# Patient Record
Sex: FEMALE | Race: WHITE | NOT HISPANIC OR LATINO | Employment: FULL TIME | URBAN - METROPOLITAN AREA
[De-identification: names, ages, dates, MRNs, and addresses within clinical notes are randomized per-mention and may not be internally consistent; named-entity substitution may affect disease eponyms.]

---

## 2017-03-21 ENCOUNTER — HOSPITAL ENCOUNTER (EMERGENCY)
Facility: HOSPITAL | Age: 42
Discharge: HOME | End: 2017-03-21
Attending: SURGERY
Payer: MEDICAID

## 2017-03-21 VITALS
HEART RATE: 71 BPM | DIASTOLIC BLOOD PRESSURE: 64 MMHG | RESPIRATION RATE: 23 BRPM | SYSTOLIC BLOOD PRESSURE: 123 MMHG | TEMPERATURE: 98 F | OXYGEN SATURATION: 100 %

## 2017-03-21 DIAGNOSIS — R42 DIZZINESS: ICD-10-CM

## 2017-03-21 DIAGNOSIS — G43.909 MIGRAINE WITHOUT STATUS MIGRAINOSUS, NOT INTRACTABLE, UNSPECIFIED MIGRAINE TYPE: Primary | ICD-10-CM

## 2017-03-21 DIAGNOSIS — G43.001 MIGRAINE WITHOUT AURA AND WITH STATUS MIGRAINOSUS, NOT INTRACTABLE: ICD-10-CM

## 2017-03-21 LAB
ALBUMIN SERPL BCP-MCNC: 4.1 G/DL
ALP SERPL-CCNC: 74 U/L
ALT SERPL W/O P-5'-P-CCNC: 18 U/L
ANION GAP SERPL CALC-SCNC: 9 MMOL/L
APTT BLDCRRT: 25.6 SEC
AST SERPL-CCNC: 18 U/L
BASOPHILS # BLD AUTO: 0.05 K/UL
BASOPHILS NFR BLD: 0.6 %
BILIRUB SERPL-MCNC: 0.5 MG/DL
BNP SERPL-MCNC: 22 PG/ML
BUN SERPL-MCNC: 15 MG/DL
CALCIUM SERPL-MCNC: 9 MG/DL
CHLORIDE SERPL-SCNC: 106 MMOL/L
CK MB SERPL-MCNC: 1.7 NG/ML
CK MB SERPL-RTO: 1.1 %
CK SERPL-CCNC: 157 U/L
CK SERPL-CCNC: 157 U/L
CO2 SERPL-SCNC: 24 MMOL/L
CREAT SERPL-MCNC: 1.2 MG/DL
D DIMER PPP IA.FEU-MCNC: 0.27 MG/L FEU
DIFFERENTIAL METHOD: ABNORMAL
EOSINOPHIL # BLD AUTO: 0.1 K/UL
EOSINOPHIL NFR BLD: 0.7 %
ERYTHROCYTE [DISTWIDTH] IN BLOOD BY AUTOMATED COUNT: 13.4 %
EST. GFR  (AFRICAN AMERICAN): >60 ML/MIN/1.73 M^2
EST. GFR  (NON AFRICAN AMERICAN): 57 ML/MIN/1.73 M^2
GLUCOSE SERPL-MCNC: 91 MG/DL
HCT VFR BLD AUTO: 35.2 %
HGB BLD-MCNC: 11.9 G/DL
INR PPP: 1
LYMPHOCYTES # BLD AUTO: 2 K/UL
LYMPHOCYTES NFR BLD: 23.1 %
MCH RBC QN AUTO: 28.1 PG
MCHC RBC AUTO-ENTMCNC: 33.8 %
MCV RBC AUTO: 83 FL
MONOCYTES # BLD AUTO: 0.5 K/UL
MONOCYTES NFR BLD: 6.4 %
NEUTROPHILS # BLD AUTO: 5.8 K/UL
NEUTROPHILS NFR BLD: 69.2 %
PLATELET # BLD AUTO: 409 K/UL
PMV BLD AUTO: 8.3 FL
POTASSIUM SERPL-SCNC: 3.8 MMOL/L
PROT SERPL-MCNC: 8.2 G/DL
PROTHROMBIN TIME: 10.5 SEC
RBC # BLD AUTO: 4.24 M/UL
SODIUM SERPL-SCNC: 139 MMOL/L
TROPONIN I SERPL DL<=0.01 NG/ML-MCNC: <0.006 NG/ML
TROPONIN I SERPL DL<=0.01 NG/ML-MCNC: <0.006 NG/ML
WBC # BLD AUTO: 8.44 K/UL

## 2017-03-21 PROCEDURE — 83880 ASSAY OF NATRIURETIC PEPTIDE: CPT

## 2017-03-21 PROCEDURE — 25000003 PHARM REV CODE 250: Performed by: SURGERY

## 2017-03-21 PROCEDURE — 85379 FIBRIN DEGRADATION QUANT: CPT

## 2017-03-21 PROCEDURE — 99285 EMERGENCY DEPT VISIT HI MDM: CPT

## 2017-03-21 PROCEDURE — 27000494 *HC OXYGEN SET UP (STAH ONLY)

## 2017-03-21 PROCEDURE — 85025 COMPLETE CBC W/AUTO DIFF WBC: CPT

## 2017-03-21 PROCEDURE — 36415 COLL VENOUS BLD VENIPUNCTURE: CPT

## 2017-03-21 PROCEDURE — 84484 ASSAY OF TROPONIN QUANT: CPT | Mod: 91

## 2017-03-21 PROCEDURE — 93010 ELECTROCARDIOGRAM REPORT: CPT | Mod: ,,, | Performed by: INTERNAL MEDICINE

## 2017-03-21 PROCEDURE — 85610 PROTHROMBIN TIME: CPT

## 2017-03-21 PROCEDURE — 80053 COMPREHEN METABOLIC PANEL: CPT

## 2017-03-21 PROCEDURE — 82553 CREATINE MB FRACTION: CPT

## 2017-03-21 PROCEDURE — 85730 THROMBOPLASTIN TIME PARTIAL: CPT

## 2017-03-21 PROCEDURE — 93005 ELECTROCARDIOGRAM TRACING: CPT

## 2017-03-21 RX ORDER — NAPROXEN SODIUM 220 MG/1
81 TABLET, FILM COATED ORAL
Status: COMPLETED | OUTPATIENT
Start: 2017-03-21 | End: 2017-03-21

## 2017-03-21 RX ORDER — MECLIZINE HYDROCHLORIDE 25 MG/1
25 TABLET ORAL 3 TIMES DAILY PRN
Qty: 20 TABLET | Refills: 0 | Status: SHIPPED | OUTPATIENT
Start: 2017-03-21 | End: 2017-04-03

## 2017-03-21 RX ORDER — ONDANSETRON 4 MG/1
4 TABLET, ORALLY DISINTEGRATING ORAL EVERY 8 HOURS PRN
Qty: 20 TABLET | Refills: 0 | Status: SHIPPED | OUTPATIENT
Start: 2017-03-21 | End: 2017-04-03

## 2017-03-21 RX ORDER — KETOROLAC TROMETHAMINE 10 MG/1
10 TABLET, FILM COATED ORAL EVERY 6 HOURS PRN
Qty: 15 TABLET | Refills: 0 | Status: SHIPPED | OUTPATIENT
Start: 2017-03-21 | End: 2017-04-03

## 2017-03-21 RX ADMIN — ASPIRIN 81 MG: 81 TABLET, CHEWABLE ORAL at 12:03
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
## 2017-03-21 NOTE — ED NOTES
Pt resting in bed, denies any concerns at this time. Pt reports a decrease in chest pressure. VSS.

## 2017-03-21 NOTE — ED PROVIDER NOTES
[facility]                                        March 21, 2017                   Chief Complaint  41 y.o. female with Chest Pain    History of Present Illness  Nolvia Garcia presents to the emergency room with hand tingling headache and chest pain  Patient presents to ER with a headache and hand tingling since this morning, chest tightness  Patient is normal sinus rhythm on EKG without ST changes or concerning findings in the ER  Patient states her hands been tingling bilaterally, she has a normal motor neuro exam today  Patient has a completely normal cardiac and pulmonary exam on evaluation the ER today  Patient had a completely negative workup including 2 sets of negative troponins/head CT    The history is provided by the patient  Medical history: Anxiety and GERD  Surgical history: Appendectomy and tubal ligation  No Known Allergies     Review of Systems and Physical Exam     Review of Systems  -- Constitution - no fever, denies fatigue, no weakness, no chills  -- Eyes - no tearing or redness, no visual disturbance  -- Ear, Nose - no tinnitus or earache, no nasal congestion or discharge  -- Mouth,Throat - no sore throat, no toothache, normal voice, normal swallowing  -- Respiratory - denies cough and congestion, no shortness of breath, no SALAS  -- Cardiovascular - chest pain, no palpitations, denies claudication  -- Gastrointestinal - denies abdominal pain, nausea, vomiting, or diarrhea  -- Genitourinary - no dysuria, no denies flank pain, no hematuria or frequency   -- Musculoskeletal - denies back pain, negative for myalgias and arthralgias   -- Neurological - dizziness, no headache, denies weakness or seizure; no LOC  -- Skin - denies pallor, rash, or changes in skin. no hives or welts noted    Vital Signs  -- Her blood pressure is 123/64 and her pulse is 71.   -- Her respiration is 23 (abnormal) and oxygen saturation is 100%.      Physical Exam  -- Nursing note and vitals reviewed  --  Constitutional: Appears well-developed and well-nourished  -- Head: Atraumatic. Normocephalic. No obvious abnormality  -- Eyes: Pupils are equal and reactive to light. Normal conjunctiva and lids  -- Cardiac: Normal rate, regular rhythm and normal heart sounds  -- Pulmonary: Normal respiratory effort, breath sounds clear to auscultation  -- Abdominal: Soft, no tenderness. Normal bowel sounds. Normal liver edge  -- Musculoskeletal: Normal range of motion, no effusions. Joints stable   -- Neurological: No focal deficits. Showed good interaction with staff  -- Vascular: Posterior tibial, dorsalis pedis and radial pulses 2+ bilaterally      Emergency Room Course     Treatment and Evaluation  -- The CT of the head performed in the ER today was negative for acute pathology   -- The EKG findings today were without concerning findings from baseline   -- The electrolytes drawn in the ER today were within normal limits   -- The CBC drawn in the ER today was within normal limits   -- The BNP drawn in the ER today were within normal limits   -- The d-dimer drawn in the ER today were within normal limits.   -- The PT, PTT, and INR were within normal limits.    -- PO ASA given in today in the ER    Diagnosis  -- Migraine without status migrainosus  -- Dizziness     Disposition and Plan  -- Disposition: home  -- Condition: stable  -- Follow-up: Patient to follow up with Maia Alford NP in 1-2 days.  -- I advised the patient that we have found no life threatening condition today  -- At this time, I believe the patient is clinically stable for discharge.   -- The patient acknowledges that close follow up with a MD is required   -- Patient agrees to comply with all instruction and direction given in the ER    This note is dictated on Dragon Natural Speaking word recognition program.  There are word recognition mistakes that are occasionally missed on review.           Peyman Cortez MD  03/21/17 8125
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
## 2017-04-03 ENCOUNTER — OFFICE VISIT (OUTPATIENT)
Dept: INTERNAL MEDICINE | Facility: CLINIC | Age: 42
End: 2017-04-03
Payer: MEDICAID

## 2017-04-03 VITALS
HEART RATE: 82 BPM | SYSTOLIC BLOOD PRESSURE: 102 MMHG | DIASTOLIC BLOOD PRESSURE: 64 MMHG | BODY MASS INDEX: 36.1 KG/M2 | OXYGEN SATURATION: 98 % | WEIGHT: 183.88 LBS | RESPIRATION RATE: 14 BRPM | HEIGHT: 60 IN

## 2017-04-03 DIAGNOSIS — D64.9 ANEMIA, UNSPECIFIED TYPE: ICD-10-CM

## 2017-04-03 DIAGNOSIS — Z12.31 ENCOUNTER FOR SCREENING MAMMOGRAM FOR MALIGNANT NEOPLASM OF BREAST: ICD-10-CM

## 2017-04-03 DIAGNOSIS — K21.9 GASTROESOPHAGEAL REFLUX DISEASE, ESOPHAGITIS PRESENCE NOT SPECIFIED: ICD-10-CM

## 2017-04-03 DIAGNOSIS — G43.009 MIGRAINE WITHOUT AURA AND WITHOUT STATUS MIGRAINOSUS, NOT INTRACTABLE: Primary | ICD-10-CM

## 2017-04-03 PROCEDURE — 99213 OFFICE O/P EST LOW 20 MIN: CPT | Mod: PBBFAC | Performed by: INTERNAL MEDICINE

## 2017-04-03 PROCEDURE — 99204 OFFICE O/P NEW MOD 45 MIN: CPT | Mod: S$PBB,,, | Performed by: INTERNAL MEDICINE

## 2017-04-03 PROCEDURE — 99999 PR PBB SHADOW E&M-EST. PATIENT-LVL III: CPT | Mod: PBBFAC,,, | Performed by: INTERNAL MEDICINE
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
## 2017-04-03 NOTE — MR AVS SNAPSHOT
[facility]  [address] [place] [address]  Phone: [phone]  Fax: [phone]                  Nolvia Garcia   4/3/2017 2:30 PM   Office Visit    Description:  Female : 1975   Provider:  Ten Hunt MD   Department:  [facility]           Reason for Visit     Migraine           Diagnoses this Visit        Comments    Migraine without aura and without status migrainosus, not intractable    -  Primary     Gastroesophageal reflux disease, esophagitis presence not specified         Anemia, unspecified type         Encounter for screening mammogram for malignant neoplasm of breast                To Do List           Goals (5 Years of Data)     None      Follow-Up and Disposition     Return in about 3 months (around 7/3/2017).      [facility]     [facility] -  Assistance  Unless otherwise directed by your provider, please contact [facility], our nurse care line that is available for  assistance.     Registered nurses in the [facility] provide: appointment scheduling, clinical advisement, health education, and other advisory services.  Call: [phone] (toll free)               Medications           Message regarding Medications     Verify the changes and/or additions to your medication regime listed below are the same as discussed with your clinician today.  If any of these changes or additions are incorrect, please notify your healthcare provider.        STOP taking these medications     hydrOXYzine pamoate (VISTARIL) 25 MG Cap Take 25 mg by mouth once daily.    ketorolac (TORADOL) 10 mg tablet Take 1 tablet (10 mg total) by mouth every 6 (six) hours as needed for Pain.    meclizine (ANTIVERT) 25 mg tablet Take 1 tablet (25 mg total) by mouth 3 (three) times daily as needed.    medroxyPROGESTERone (PROVERA) 10 MG tablet 1 po day on 16- of cycle    metoclopramide HCl (REGLAN) 5 MG tablet Take at onset  of headache with Imitrex. May repeat dose in 4 hours if necessary. Limit use to 3 days per week.    ondansetron (ZOFRAN-ODT) 4 MG TbDL Take 1 tablet (4 mg total) by mouth every 8 (eight) hours as needed (nausea).    rabeprazole (ACIPHEX) 20 mg tablet Take 20 mg by mouth once daily.    citalopram (CELEXA) 20 MG tablet Take 1 tablet (20 mg total) by mouth once daily.           Verify that the below list of medications is an accurate representation of the medications you are currently taking.  If none reported, the list may be blank. If incorrect, please contact your healthcare provider. Carry this list with you in case of emergency.           Current Medications     sumatriptan (IMITREX) 100 MG tablet Take one tablet at onset of headache. May repeat in 2 hours if needed. Max of 2 tabs in 24 hours    topiramate (TOPAMAX) 50 MG tablet One daily for headache prevention           Clinical Reference Information           Your Vitals Were     BP Pulse Resp Height Weight Last Period    102/64 82 14 5' (1.524 m) 83.4 kg (183 lb 13.8 oz) 02/02/2017    SpO2 BMI             98% 35.91 kg/m2         Blood Pressure          Most Recent Value    BP  102/64      Allergies as of 4/3/2017     No Known Allergies      Immunizations Administered on Date of Encounter - 4/3/2017     None      Orders Placed During Today's Visit     Future Labs/Procedures Expected by Expires    Mammo Digital Screening Bilat with CAD  4/3/2017 (Approximate) 9/30/2017    CBC auto differential  5/2/2017 4/3/2018      Language Assistance Services     ATTENTION: Language assistance services are available, free of charge. Please call [phone].      ATENCIÓN: Si habla español, tiene a rios disposición servicios gratuitos de asistencia lingüística. Llame al [phone].     CHÚ Ý: N?u b?n nói Ti?ng Vi?t, có các d?ch v? h? tr? ngôn ng? mi?n phí dành cho b?n. G?i s? [phone].         [facility] complies with applicable Federal civil  rights laws and does not discriminate on the basis of race, color, national origin, age, disability, or sex.

## 2017-04-17 ENCOUNTER — HOSPITAL ENCOUNTER (OUTPATIENT)
Dept: RADIOLOGY | Facility: HOSPITAL | Age: 42
Discharge: HOME | End: 2017-04-17
Attending: INTERNAL MEDICINE
Payer: MEDICAID

## 2017-04-17 DIAGNOSIS — Z12.31 ENCOUNTER FOR SCREENING MAMMOGRAM FOR MALIGNANT NEOPLASM OF BREAST: ICD-10-CM

## 2017-04-17 PROCEDURE — 77067 SCR MAMMO BI INCL CAD: CPT | Mod: 26,,, | Performed by: RADIOLOGY

## 2017-04-17 PROCEDURE — 77063 BREAST TOMOSYNTHESIS BI: CPT | Mod: 26,,, | Performed by: RADIOLOGY

## 2017-04-17 PROCEDURE — 77067 SCR MAMMO BI INCL CAD: CPT | Mod: TC

## 2018-06-04 ENCOUNTER — OFFICE VISIT (OUTPATIENT)
Dept: OBSTETRICS AND GYNECOLOGY | Facility: CLINIC | Age: 43
End: 2018-06-04
Payer: MEDICAID

## 2018-06-04 VITALS
SYSTOLIC BLOOD PRESSURE: 103 MMHG | HEART RATE: 73 BPM | RESPIRATION RATE: 16 BRPM | WEIGHT: 188 LBS | BODY MASS INDEX: 36.91 KG/M2 | HEIGHT: 60 IN | DIASTOLIC BLOOD PRESSURE: 67 MMHG

## 2018-06-04 DIAGNOSIS — N92.1 MENORRHAGIA WITH IRREGULAR CYCLE: ICD-10-CM

## 2018-06-04 DIAGNOSIS — Z12.31 ENCOUNTER FOR SCREENING MAMMOGRAM FOR BREAST CANCER: ICD-10-CM

## 2018-06-04 DIAGNOSIS — Z00.00 HEALTH MAINTENANCE EXAMINATION: ICD-10-CM

## 2018-06-04 DIAGNOSIS — Z01.419 WELL WOMAN EXAM WITH ROUTINE GYNECOLOGICAL EXAM: Primary | ICD-10-CM

## 2018-06-04 DIAGNOSIS — Z12.4 CERVICAL CANCER SCREENING: ICD-10-CM

## 2018-06-04 PROCEDURE — 99213 OFFICE O/P EST LOW 20 MIN: CPT | Mod: PBBFAC | Performed by: OBSTETRICS & GYNECOLOGY

## 2018-06-04 PROCEDURE — 99386 PREV VISIT NEW AGE 40-64: CPT | Mod: S$PBB,,, | Performed by: OBSTETRICS & GYNECOLOGY

## 2018-06-04 PROCEDURE — 99999 PR PBB SHADOW E&M-EST. PATIENT-LVL III: CPT | Mod: PBBFAC,,, | Performed by: OBSTETRICS & GYNECOLOGY

## 2018-06-04 PROCEDURE — 88175 CYTOPATH C/V AUTO FLUID REDO: CPT

## 2018-06-04 NOTE — PROGRESS NOTES
Subjective:    Patient ID: Nolvia Garcia is a 42 y.o. female.     Chief Complaint: Annual Well Woman Exam     History of Present Illness:  Nolvia presents today for Annual Well Woman exam. Has a history of BTL ~ 11 years ago.  States she has been having abnormal bleeding.  She started having a discharge with blood ~ 2 weeks ago.  Developed a severe migraine two days later.  Bleeding increased slightly and she passed a small blood clot.  Bleeding has remained light since and daily.  Has not had bleeding like a regular cycle yet.  She normally has migraines and was taking topamax daily and then imitrex if needed but these were discontinued by her PCP ~ 1 year ago..    She has been having migraines at least once weekly.  Has nausea and sound sensitivity.  Prior to the past two weeks, cycles have been regular.  She had normal cycle in March.  Did not have a cycle in April and first bleeding after was ~ 2 weeks ago.  Denies any pelvic pain and cramping.  She denies any breast tenderness, masses, or nipple discharge.  She has a family history of breast cancer in her maternal grandmother (diagnosed in her late 50s).  She denies any problems with urination or with bowel movements.  She is due for health maintenance.    Past Medical History:   Diagnosis Date    Abnormal Pap smear of cervix     Anxiety     GERD (gastroesophageal reflux disease)     Migraines      Past Surgical History:   Procedure Laterality Date    APPENDECTOMY      TUBAL LIGATION       Review of patient's allergies indicates:  No Known Allergies  Current Outpatient Prescriptions on File Prior to Visit   Medication Sig Dispense Refill    sumatriptan (IMITREX) 100 MG tablet Take one tablet at onset of headache. May repeat in 2 hours if needed. Max of 2 tabs in 24 hours 18 tablet 5    topiramate (TOPAMAX) 50 MG tablet One daily for headache prevention 30 tablet 6     No current facility-administered medications on file prior to visit.      Social  History     Social History    Marital status: Single     Spouse name: N/A    Number of children: N/A    Years of education: N/A     Social History Main Topics    Smoking status: Never Smoker    Smokeless tobacco: None    Alcohol use No    Drug use: No    Sexual activity: Yes     Partners: Male     Birth control/ protection: Surgical     Other Topics Concern    None     Social History Narrative    None     Family History   Problem Relation Age of Onset    No Known Problems Mother     No Known Problems Father     Breast cancer Maternal Grandmother     Colon cancer Neg Hx     Ovarian cancer Neg Hx      The following portions of the patient's history were reviewed and updated as appropriate: allergies, current medications, past family history, past medical history, past social history, past surgical history and problem list.      Menstrual History:   Patient's last menstrual period was 2018 (exact date)..     OB History      Para Term  AB Living    4 2     2 2    SAB TAB Ectopic Multiple Live Births    2       2            Review of Systems   Genitourinary: Positive for menorrhagia and menstrual problem.   All other systems reviewed and are negative.        Objective:    Vital Signs:  Vitals:    18 1341   BP: 103/67   Pulse: 73   Resp: 16       Physical Exam:  General:  alert,normal appearing female   Skin:  Skin color, texture, turgor normal. No rashes or lesions   HEENT:  conjunctivae/corneas clear.   Neck: supple, trachea midline   Respiratory:  clear to auscultation bilaterally   Heart:  regular rate and rhythm   Breasts:   Symmetrical; no palpable masses or lymphadenopathy; Nipples are protruding and have no nipple discharge. No palpable masses, erythema, skin changes, tenderness, or adenopathy.   Abdomen:  soft, non-tender. Bowel sounds normal. No masses,  no organomegaly   Pelvis: External genitalia: normal general appearance  Urinary system: urethral meatus normal,  bladder nontender  Vaginal: normal mucosa without prolapse or lesions  Cervix: normal appearance  Uterus: normal single, nontender  Adnexa: normal bimanual exam   Extremities: Normal ROM; no edema, no cyanosis   Neurologial: Normal strength and tone. No focal numbness or weakness. Reflexes 2+ and equal.   Psychiatric: normal mood, speech, dress, and thought processes           Assessment:      1. Well woman exam with routine gynecological exam    2. Cervical cancer screening    3. Menorrhagia with irregular cycle    4. Encounter for screening mammogram for breast cancer          Plan:      Well woman exam with routine gynecological exam    Cervical cancer screening  -     Liquid-based pap smear, screening    Menorrhagia with irregular cycle  -     US OB/GYN Procedure ([facility]) - Extended List; Future    Encounter for screening mammogram for breast cancer  -     Mammo Digital Screening Bilat with CAD; Future; Expected date: 06/04/2018        COUNSELING:  Nolvia was counseled on A.C.O.G. Pap guidelines and recommendations for yearly pelvic exams in addition to recommendations for yearly mammograms and monthly self breast exams. She was counseled on further evaluation and management options for menorrhagia.  Will await ultrasound and then will manage accordingly.  Discussed provera for management as estrogen based methods likely to worsen headaches.  In addition, she is to see her PCP for other health maintenance.  Health maintenance labs ordered.

## 2018-06-05 ENCOUNTER — PROCEDURE VISIT (OUTPATIENT)
Dept: OBSTETRICS AND GYNECOLOGY | Facility: CLINIC | Age: 43
End: 2018-06-05
Payer: MEDICAID

## 2018-06-05 DIAGNOSIS — N92.1 MENORRHAGIA WITH IRREGULAR CYCLE: ICD-10-CM

## 2018-06-05 PROCEDURE — 76830 TRANSVAGINAL US NON-OB: CPT | Mod: PBBFAC | Performed by: OBSTETRICS & GYNECOLOGY

## 2018-06-05 PROCEDURE — 76830 TRANSVAGINAL US NON-OB: CPT | Mod: 26,S$PBB,, | Performed by: OBSTETRICS & GYNECOLOGY

## 2018-06-07 DIAGNOSIS — N83.201 RIGHT OVARIAN CYST: ICD-10-CM

## 2018-06-07 DIAGNOSIS — N92.1 MENORRHAGIA WITH IRREGULAR CYCLE: Primary | ICD-10-CM

## 2018-06-07 DIAGNOSIS — R93.89 THICKENED ENDOMETRIUM: ICD-10-CM

## 2018-06-11 ENCOUNTER — HOSPITAL ENCOUNTER (EMERGENCY)
Facility: HOSPITAL | Age: 43
Discharge: HOME | End: 2018-06-11
Payer: MEDICAID

## 2018-06-11 ENCOUNTER — OFFICE VISIT (OUTPATIENT)
Dept: OBSTETRICS AND GYNECOLOGY | Facility: CLINIC | Age: 43
End: 2018-06-11
Payer: MEDICAID

## 2018-06-11 ENCOUNTER — HOSPITAL ENCOUNTER (OUTPATIENT)
Dept: RADIOLOGY | Facility: HOSPITAL | Age: 43
Discharge: HOME | End: 2018-06-11
Attending: OBSTETRICS & GYNECOLOGY
Payer: MEDICAID

## 2018-06-11 VITALS
TEMPERATURE: 98 F | HEART RATE: 85 BPM | WEIGHT: 188 LBS | SYSTOLIC BLOOD PRESSURE: 151 MMHG | DIASTOLIC BLOOD PRESSURE: 91 MMHG | HEIGHT: 60 IN | BODY MASS INDEX: 36.91 KG/M2 | OXYGEN SATURATION: 99 %

## 2018-06-11 VITALS
RESPIRATION RATE: 17 BRPM | WEIGHT: 189 LBS | BODY MASS INDEX: 37.11 KG/M2 | HEIGHT: 60 IN | HEART RATE: 76 BPM | SYSTOLIC BLOOD PRESSURE: 118 MMHG | DIASTOLIC BLOOD PRESSURE: 80 MMHG

## 2018-06-11 DIAGNOSIS — G43.001 MIGRAINE WITHOUT AURA AND WITH STATUS MIGRAINOSUS, NOT INTRACTABLE: ICD-10-CM

## 2018-06-11 DIAGNOSIS — N92.1 MENORRHAGIA WITH IRREGULAR CYCLE: Primary | ICD-10-CM

## 2018-06-11 DIAGNOSIS — Z01.818 PREOPERATIVE TESTING: ICD-10-CM

## 2018-06-11 DIAGNOSIS — R93.89 THICKENED ENDOMETRIUM: ICD-10-CM

## 2018-06-11 DIAGNOSIS — N93.8 DYSFUNCTIONAL UTERINE BLEEDING: Primary | ICD-10-CM

## 2018-06-11 DIAGNOSIS — G43.009 MIGRAINE WITHOUT AURA AND WITHOUT STATUS MIGRAINOSUS, NOT INTRACTABLE: ICD-10-CM

## 2018-06-11 DIAGNOSIS — Z12.31 ENCOUNTER FOR SCREENING MAMMOGRAM FOR BREAST CANCER: ICD-10-CM

## 2018-06-11 PROCEDURE — 99283 EMERGENCY DEPT VISIT LOW MDM: CPT | Mod: 27

## 2018-06-11 PROCEDURE — 99499 UNLISTED E&M SERVICE: CPT | Mod: S$PBB,,, | Performed by: OBSTETRICS & GYNECOLOGY

## 2018-06-11 PROCEDURE — 77067 SCR MAMMO BI INCL CAD: CPT | Mod: 26,,, | Performed by: RADIOLOGY

## 2018-06-11 PROCEDURE — 77067 SCR MAMMO BI INCL CAD: CPT | Mod: TC

## 2018-06-11 PROCEDURE — 77063 BREAST TOMOSYNTHESIS BI: CPT | Mod: 26,,, | Performed by: RADIOLOGY

## 2018-06-11 PROCEDURE — 99213 OFFICE O/P EST LOW 20 MIN: CPT | Mod: PBBFAC,25 | Performed by: OBSTETRICS & GYNECOLOGY

## 2018-06-11 PROCEDURE — 99999 PR PBB SHADOW E&M-EST. PATIENT-LVL III: CPT | Mod: PBBFAC,,, | Performed by: OBSTETRICS & GYNECOLOGY

## 2018-06-11 RX ORDER — SUMATRIPTAN SUCCINATE 100 MG/1
TABLET ORAL
Qty: 18 TABLET | Refills: 1 | Status: SHIPPED | OUTPATIENT
Start: 2018-06-11 | End: 2021-07-09

## 2018-06-11 RX ORDER — TOPIRAMATE 50 MG/1
TABLET, FILM COATED ORAL
Qty: 30 TABLET | Refills: 1 | Status: SHIPPED | OUTPATIENT
Start: 2018-06-11 | End: 2021-07-09

## 2018-06-12 ENCOUNTER — HOSPITAL ENCOUNTER (OUTPATIENT)
Dept: PULMONOLOGY | Facility: HOSPITAL | Age: 43
Discharge: HOME | End: 2018-06-12
Attending: OBSTETRICS & GYNECOLOGY
Payer: MEDICAID

## 2018-06-12 ENCOUNTER — HOSPITAL ENCOUNTER (OUTPATIENT)
Dept: RADIOLOGY | Facility: HOSPITAL | Age: 43
Discharge: HOME | End: 2018-06-12
Attending: OBSTETRICS & GYNECOLOGY
Payer: MEDICAID

## 2018-06-12 ENCOUNTER — HOSPITAL ENCOUNTER (OUTPATIENT)
Dept: PREADMISSION TESTING | Facility: HOSPITAL | Age: 43
Discharge: HOME | End: 2018-06-12
Attending: OBSTETRICS & GYNECOLOGY
Payer: MEDICAID

## 2018-06-12 ENCOUNTER — ANESTHESIA EVENT (OUTPATIENT)
Dept: SURGERY | Facility: HOSPITAL | Age: 43
End: 2018-06-12
Payer: MEDICAID

## 2018-06-12 ENCOUNTER — TELEPHONE (OUTPATIENT)
Dept: OBSTETRICS AND GYNECOLOGY | Facility: CLINIC | Age: 43
End: 2018-06-12

## 2018-06-12 DIAGNOSIS — Z01.818 PREOPERATIVE TESTING: ICD-10-CM

## 2018-06-12 PROCEDURE — 71046 X-RAY EXAM CHEST 2 VIEWS: CPT | Mod: 26,,, | Performed by: RADIOLOGY

## 2018-06-12 PROCEDURE — 93010 ELECTROCARDIOGRAM REPORT: CPT | Mod: ,,, | Performed by: INTERNAL MEDICINE

## 2018-06-12 PROCEDURE — 71046 X-RAY EXAM CHEST 2 VIEWS: CPT | Mod: TC

## 2018-06-12 PROCEDURE — 93005 ELECTROCARDIOGRAM TRACING: CPT

## 2018-06-12 NOTE — TELEPHONE ENCOUNTER
Surgery moved to 6/13/18 per case request number [id number].  Pt notified and verbalized understanding.

## 2018-06-12 NOTE — H&P
Subjective:    Patient ID: Nolvia Garcia is a 42 y.o. y.o. female.     Chief Complaint:   Chief Complaint   Patient presents with    Pre-op Exam       History of Present Illness   Nolvia presents today for preoperative counseling prior to planned D&C.  States she has been having abnormal bleeding for ~ 1 month now. Bleeding initially began as a blood discharge but has since progressed.  Bleeding increased slightly and she passed a small blood clot.  Bleeding has remained light since and daily.  Ultrasound revealed markedly thickened endometrium and a 3.0 cm simple appearing right ovarian cyst.  Developed a severe migraine after bleeding began.  She normally has migraines and was taking topamax daily and then imitrex if needed but these were discontinued by her PCP ~ 1 year ago. Requests these Rxs until she can be seen back by her PCP.    She has been having migraines at least once weekly.  Has nausea and sound sensitivity.  Prior to the past two weeks, cycles have been regular.  She had normal cycle in March.  Did not have a cycle in April and first bleeding after was in May and is the current bleeding episdoe.  Denies any pelvic pain and cramping. She has a history of BTL ~ 11 years ago.      Past Medical History:   Diagnosis Date    Abnormal Pap smear of cervix     Anxiety     GERD (gastroesophageal reflux disease)     Migraines      Past Surgical History:   Procedure Laterality Date    APPENDECTOMY      TUBAL LIGATION       Review of patient's allergies indicates:  No Known Allergies  No current outpatient prescriptions on file prior to visit.     No current facility-administered medications on file prior to visit.      Social History     Social History    Marital status: Single     Spouse name: N/A    Number of children: N/A    Years of education: N/A     Occupational History    Not on file.     Social History Main Topics    Smoking status: Never Smoker    Smokeless tobacco: Not on file    Alcohol  use No    Drug use: No    Sexual activity: Yes     Partners: Male     Birth control/ protection: Surgical     Other Topics Concern    Not on file     Social History Narrative    No narrative on file     Family History   Problem Relation Age of Onset    No Known Problems Mother     No Known Problems Father     Breast cancer Maternal Grandmother     Colon cancer Neg Hx     Ovarian cancer Neg Hx      The following portions of the patient's history were reviewed and updated as appropriate: allergies, current medications, past family history, past medical history, past social history, past surgical history and problem list.      Review of Systems   Genitourinary: Positive for menorrhagia and menstrual problem.   Neurological: Positive for headaches.   All other systems reviewed and are negative.        Objective:    Vital Signs:  Vitals:    06/11/18 1329   BP: 118/80   Pulse: 76   Resp: 17       Physical Exam:  General:  alert; oriented; well-nourished female   Skin:  Skin color, texture, turgor normal. No rashes or lesions   Cardiovascular:  Pulmonary:  Abdomen: Regular rate and rhythm  Clear to auscultation bilaterally  soft, non-tender. Bowel sounds normal. No masses,  no organomegaly   Pelvis: Deferred              Assessment:      1. Menorrhagia with irregular cycle    2. Thickened endometrium    3. Migraine without aura and without status migrainosus, not intractable    4. Migraine without aura and with status migrainosus, not intractable    5. Preoperative testing          Plan:      Menorrhagia with irregular cycle    Thickened endometrium    Migraine without aura and without status migrainosus, not intractable  -     topiramate (TOPAMAX) 50 MG tablet; One daily for headache prevention  Dispense: 30 tablet; Refill: 1    Migraine without aura and with status migrainosus, not intractable  -     sumatriptan (IMITREX) 100 MG tablet; Take one tablet at onset of headache. May repeat in 2 hours if needed. Max  of 2 tabs in 24 hours  Dispense: 18 tablet; Refill: 1    Preoperative testing  -     EKG 12-lead; Future; Expected date: 06/11/2018  -     X-Ray Chest PA And Lateral; Future; Expected date: 06/11/2018  -     Urinalysis; Future  -     Type & Screen; Future; Expected date: 06/11/2018      Counseled on further evaluation and management options.   Pt desires to proceed with D&C.  Pt considering whether she desires Novasure ablation as well.   Counseled that if hyperplasia or cancer identified on D&C pathology that additional surgery/procedures may be needed in case of prior ablation.  Counseled on risks, benefits, alternatives, and potential side effects.   Pt verbalized understanding.  Consents signed.  Will proceed with D&C (+/- Novasure endometrial ablation) pending acceptable preoperative testing.

## 2018-06-12 NOTE — TELEPHONE ENCOUNTER
Please call patient to see if she is available to have D&C done tomorrow morning.  Alternatively, if bleeding remains significantly heavy, can do it as an emergent case this evening.

## 2018-06-12 NOTE — ANESTHESIA PREPROCEDURE EVALUATION
06/12/2018  Nolvia Garcia is a 42 y.o., female.    Anesthesia Evaluation    I have reviewed the Patient Summary Reports.    I have reviewed the Nursing Notes.   I have reviewed the Medications.     Review of Systems  Anesthesia Hx:  No problems with previous Anesthesia  History of prior surgery of interest to airway management or planning:  Denies Personal Hx of Anesthesia complications.   Social:  Non-Smoker, No Alcohol Use    Hematology/Oncology:     Oncology Normal    -- Anemia:   EENT/Dental:EENT/Dental Normal   Cardiovascular:  Cardiovascular Normal Exercise tolerance: good     Pulmonary:  Pulmonary Normal    Renal/:  Renal/ Normal     Hepatic/GI:   GERD, well controlled    Musculoskeletal:  Musculoskeletal Normal    Neurological:   Headaches    Endocrine:  Endocrine Normal    Dermatological:  Skin Normal    Psych:  Psychiatric Normal           Physical Exam  General:  Well nourished, Obesity    Airway/Jaw/Neck:  Airway Findings: Mouth Opening: Normal Tongue: Normal  General Airway Assessment: Adult  Mallampati: III  TM Distance: Normal, at least 6 cm  Jaw/Neck Findings:     Neck ROM: Normal ROM      Dental:  Dental Findings: In tact        Mental Status:  Mental Status Findings:  Cooperative, Alert and Oriented         Anesthesia Plan  Type of Anesthesia, risks & benefits discussed:  Anesthesia Type:  general  Patient's Preference:   Intra-op Monitoring Plan:   Intra-op Monitoring Plan Comments:   Post Op Pain Control Plan: multimodal analgesia  Post Op Pain Control Plan Comments:   Induction:   IV  Beta Blocker:  Patient is not currently on a Beta-Blocker (No further documentation required).       Informed Consent: Patient understands risks and agrees with Anesthesia plan.  Questions answered. Anesthesia consent signed with patient.  ASA Score: 2     Day of Surgery Review of History &  Physical: I have interviewed and examined the patient. I have reviewed the patient's H&P dated: 6/13/18. There are no significant changes.  H&P update referred to the surgeon.         Ready For Surgery From Anesthesia Perspective.

## 2018-06-12 NOTE — TELEPHONE ENCOUNTER
----- Message from Ciera Chaudhari sent at 6/12/2018  8:39 AM CDT -----  Contact: self  Nolvia Garcia  MRN: [id number]  Home Phone      [phone]  Work Phone      Not on file.  Mobile          [phone]    Patient Care Team:  Lesly Mendoza MD as PCP - General (Obstetrics)  OB? No  What phone number can you be reached at? [phone]  Message: the patient states she started bleeding extremely heavy with blood clots last night. She is scheduled for a D&C with Dr. Mendoza but not until 6/25/18.

## 2018-06-12 NOTE — DISCHARGE INSTRUCTIONS
[facility]  Pre Admit Instructions    Day and Date of Procedure: Wednesday 6-13-18  Arrival time: 730am      · Call your doctor if you become ill before your surgery       - 7 a.m. To 5 p.m. Enter through Patient Registration Main Lobby  · You must have a responsible  to bring you home    Do NOT eat or drink anything   past midnight before your procedure day    Please    · Do not wear makeup, jewelry, nail polish or body piercings  · Bring containers/solution for contacts, dentures, bridges - these and hearing aids will be removed before your procedure  · Do not bring cash, jewelry or valuables the day of your procedure   · No smoking at least 24 hours before your procedure  · Wear clothing that is comfortable and easy to take off and put on  · Do NOT shave for at least 5 days before your surgery    Review skin preparation handout before using. Shower with Hibiclens the Night before the procedure. Bring remaining Hibiclens with you the morning of surgery.                Information about your stay (Please Review)    Before Surgery  1. Cafeteria Meals: 7am to 10am; 11am to 1:30 pm; Dinner/Supper must may be ordered between 11:00 am and 4 pm from the [facility] Cafe After [employer] Menu. Food will be available to  between 5 pm and 6 pm. The kitchen phone extension is [phone].  2. Your doctor may order and review labs, x-rays, ECG or other tests as a pre-surgery workup and will call you if there is need for follow up.  3. No smoking inside or outside the hospital on hospital grounds.  4. Wear clothing that is easy to take off and put on.  The hospital will provide you with a gown.  5. You may bring robe, slippers, nightwear, and toiletries (toothbrush, toothpaste, makeup).  6. If your doctor orders a Fleets Enema or other prep, follow package and/or doctors orders.  7. Brush your teeth and rinse your mouth the morning of surgery, but dont swallow the water.  8. The nurse will ask questions and check  your condition.  The doctor may gabby your surgical site.  9. Compression boots may be put on your calves to reduce the risk of blood clots.  10. The doctor may order medicine to help you relax before surgery.  After Surgery  1. The nurse will check your temperature, breathing, blood pressure, heart rate, IV site, and surgery site.  2. A diet will be ordered-most start with ice chips and then advance slowly to other foods.  3. If you have IV fluids the IV pump will beep to let the nurse know that she needs to check it.  4. You may have a urinary catheter and staff may measure your oral intake and urine output.  5. Pain medication may be ordered by the doctor after surgery.  If you have a pain management device tell your caretakers not to press the button because of OVERDOSE RISK.  6. When the nurse or doctor tells you it is okay to get out of bed, ask for help until you are stable.  7. The nurse may ask you to turn, cough, and deep breathe to prevent lung problems.  You can use a pillow to hold your incision when you deep breathe or cough to reduce pain.  8. The nurse will give you discharge instructions--incision care, symptoms to report to your doctor, and your follow-up appointment when you are discharged.  You cannot drive yourself home.  Goal for Discharge from One Day Surgery  · Control pain with an oral medication  · Walk without feeling dizzy or weak  · Tolerate liquids well  · Urinate without difficulty    Things you can do to  Reduce the Risk of Infections or Complications  Wash Hands and use Waterless Hand Sanitizers  · Wash hands frequently with soap and warm water for at least 15 seconds.   · Use hand sanitizers (alcohol based) often at home and in public if hands are not visibly soiled  Take Antibiotic Exactly as Prescribed  · Do not stop antibiotics too soon; you risk developing infection resistant to antibiotics  · Take your antibiotic even if you are feeling better and even if they upset your  stomach  · Call the doctor if you cant tolerate the antibiotic or you have an allergic reaction  Stay Healthy  · Take medicines as prescribed by your doctor  · Keep your diabetes under control - diet and medication  · Get enough rest, exercise and eat a healthy diet  Keep the Wound Clean and Dry  · Wash hands before and after taking care of the incision (cut)  · Wash hands when you remove a dressing, before you touch/apply a new dressing  · Shower and clean incision with antibacterial soap and rinse well if the doctor approves  · Allow the cut to dry completely before putting on a clean dressing  · Do not touch the part of the bandage that will cover the incision  · Do not use ointments unless your doctor tells you to-can promote bacterial growth  · If ordered, put ointment directly on the dressing-do not touch the end of the tube  · Do not scrub, remove scabs, or leave a damp dressing on the incision  · Do not use peroxide or alcohol to clean the incision unless the doctor tells you to   · Do not let children, pets or anyone else contaminate the incision  Stop Smoking To Prevent Infection  · Stop smoking-Centers for Disease Control recommends 30 days before surgery  · Smokers get more infections after surgery-studies have shown 6 times the risk  · Smokers have more scarring and heal slower-open wounds get infected easier  Prevent Respiratory complications  · Stop smoking  · Turn, cough, and deep breathe even if you have some pain when you do so.  · Splint your incision with a pillow when you cough/deep breath, to help control pain.  · Do not lie in one position for long periods of time.   Prevent Blood Clots  · When you wake move your legs, flex your feet, rotate your ankles, wiggle your toes  · Get up when the doctor says its ok.  Dangle your feet from the side of the bed  · Report symptoms-leg pain, redness/swelling, warm to touch; fever; shortness of breath, chest pain, severe upper back pain.

## 2018-06-12 NOTE — ED PROVIDER NOTES
"Encounter Date: 6/11/2018       History     Chief Complaint   Patient presents with    Vaginal Bleeding     42-year-old female presents to the emergency department complaining of passing 3 vaginal clots today.  Patient states she has a history of vaginal bleeding and is scheduled for D&C later this month because the lining of her uterus is " too thick".  She denies fevers/chills, nausea/vomiting.  Patient brought one of the vaginal clots and states she passed 2 others identical to the one she brought to the emergency department.      The history is provided by the patient. No  was used.   Vaginal Bleeding   This is a recurrent problem. The current episode started 3 to 5 hours ago. The problem has been resolved. Pertinent negatives include no chest pain, no abdominal pain, no headaches and no shortness of breath. Nothing aggravates the symptoms.     Review of patient's allergies indicates:  No Known Allergies  Past Medical History:   Diagnosis Date    Abnormal Pap smear of cervix     Anxiety     GERD (gastroesophageal reflux disease)     Migraines      Past Surgical History:   Procedure Laterality Date    APPENDECTOMY      TUBAL LIGATION       Family History   Problem Relation Age of Onset    No Known Problems Mother     No Known Problems Father     Breast cancer Maternal Grandmother     Colon cancer Neg Hx     Ovarian cancer Neg Hx      Social History   Substance Use Topics    Smoking status: Never Smoker    Smokeless tobacco: Not on file    Alcohol use No     Review of Systems   Respiratory: Negative for shortness of breath.    Cardiovascular: Negative for chest pain.   Gastrointestinal: Negative for abdominal pain.   Genitourinary: Positive for vaginal bleeding. Negative for difficulty urinating, flank pain, genital sores, hematuria and urgency.   Neurological: Negative for headaches.   All other systems reviewed and are negative.      Physical Exam     Initial Vitals [06/11/18 " "2036]   BP Pulse Resp Temp SpO2   (!) 151/91 85 -- 97.9 °F (36.6 °C) 99 %      MAP       --         Physical Exam    Nursing note and vitals reviewed.  Constitutional: She appears well-developed and well-nourished. No distress.   HENT:   Head: Normocephalic and atraumatic.   Right Ear: External ear normal.   Left Ear: External ear normal.   Eyes: Conjunctivae and EOM are normal. Pupils are equal, round, and reactive to light.   Neck: Normal range of motion. Neck supple.   Cardiovascular: Normal rate, regular rhythm and intact distal pulses.   Pulmonary/Chest: Breath sounds normal. No respiratory distress.   Abdominal: Soft. Bowel sounds are normal. She exhibits no distension. There is no tenderness.   Musculoskeletal: Normal range of motion. She exhibits no edema or tenderness.   Neurological: She is alert and oriented to person, place, and time. She has normal strength.   Skin: Skin is warm and dry. Capillary refill takes less than 2 seconds. No pallor.   Psychiatric: She has a normal mood and affect. Thought content normal.         ED Course   Procedures  Labs Reviewed - No data to display       No orders to display        Medical Decision Making:   Initial Assessment:   42-year-old female presents to the emergency department complaining of passing 3 vaginal clots today.  Patient states she has a history of vaginal bleeding and is scheduled for D&C later this month because the lining of her uterus is " too thick".  She denies fevers/chills, nausea/vomiting.  Patient brought one of the vaginal clots and states she passed 2 others identical to the one she brought to the emergency department.    ED Management:  Patient was found to not have signs of significant anemia.  The amount of blood lost from a clot that she brought to the emergency department was minimal and patient was counseled on signs of significant anemia.  She was advised to follow-up with her primary care physician tomorrow for reevaluation and was " given instructions for dysfunctional uterine bleeding.                      Clinical Impression:   The encounter diagnosis was Dysfunctional uterine bleeding.      Disposition:   Disposition: Discharged  Condition: Stable                        Oscar Friedman MD  06/11/18 2100

## 2018-06-12 NOTE — TELEPHONE ENCOUNTER
Patient seen in clinic yesterday scheduled for D&C 6/25/18 states last night she was seen in the ER at [facility] for heavy bleeding. States today she is saturating a large pad every half hour. Requesting to know if D&C can be scheduled sooner. Please advise. Denies s/s of anemia.

## 2018-06-13 ENCOUNTER — ANESTHESIA (OUTPATIENT)
Dept: SURGERY | Facility: HOSPITAL | Age: 43
End: 2018-06-13
Payer: MEDICAID

## 2018-06-13 ENCOUNTER — SURGERY (OUTPATIENT)
Age: 43
End: 2018-06-13

## 2018-06-13 ENCOUNTER — HOSPITAL ENCOUNTER (OUTPATIENT)
Facility: HOSPITAL | Age: 43
Discharge: HOME | End: 2018-06-13
Attending: OBSTETRICS & GYNECOLOGY | Admitting: OBSTETRICS & GYNECOLOGY
Payer: MEDICAID

## 2018-06-13 VITALS
TEMPERATURE: 96 F | HEART RATE: 60 BPM | WEIGHT: 188 LBS | BODY MASS INDEX: 37.9 KG/M2 | HEIGHT: 59 IN | OXYGEN SATURATION: 96 % | SYSTOLIC BLOOD PRESSURE: 133 MMHG | DIASTOLIC BLOOD PRESSURE: 81 MMHG | RESPIRATION RATE: 16 BRPM

## 2018-06-13 DIAGNOSIS — N93.8 DYSFUNCTIONAL UTERINE BLEEDING: ICD-10-CM

## 2018-06-13 DIAGNOSIS — N92.1 MENORRHAGIA WITH IRREGULAR CYCLE: Primary | ICD-10-CM

## 2018-06-13 DIAGNOSIS — Z98.890 S/P D&C (STATUS POST DILATION AND CURETTAGE): ICD-10-CM

## 2018-06-13 DIAGNOSIS — Z98.890 S/P ENDOMETRIAL ABLATION: ICD-10-CM

## 2018-06-13 DIAGNOSIS — R93.89 THICKENED ENDOMETRIUM: ICD-10-CM

## 2018-06-13 LAB — B-HCG UR QL: NEGATIVE

## 2018-06-13 PROCEDURE — 81025 URINE PREGNANCY TEST: CPT

## 2018-06-13 PROCEDURE — 00940 ANES VAGINAL PX NOS: CPT | Performed by: OBSTETRICS & GYNECOLOGY

## 2018-06-13 PROCEDURE — 37000008 HC ANESTHESIA 1ST 15 MINUTES: Performed by: OBSTETRICS & GYNECOLOGY

## 2018-06-13 PROCEDURE — 88305 TISSUE EXAM BY PATHOLOGIST: CPT | Performed by: PATHOLOGY

## 2018-06-13 PROCEDURE — 88305 TISSUE EXAM BY PATHOLOGIST: CPT | Mod: 26,,, | Performed by: PATHOLOGY

## 2018-06-13 PROCEDURE — 63600175 PHARM REV CODE 636 W HCPCS: Performed by: NURSE ANESTHETIST, CERTIFIED REGISTERED

## 2018-06-13 PROCEDURE — 58353 ENDOMETR ABLATE THERMAL: CPT | Mod: ,,, | Performed by: OBSTETRICS & GYNECOLOGY

## 2018-06-13 PROCEDURE — 25000003 PHARM REV CODE 250: Performed by: NURSE ANESTHETIST, CERTIFIED REGISTERED

## 2018-06-13 PROCEDURE — 37000009 HC ANESTHESIA EA ADD 15 MINS: Performed by: OBSTETRICS & GYNECOLOGY

## 2018-06-13 PROCEDURE — 71000033 HC RECOVERY, INTIAL HOUR: Performed by: OBSTETRICS & GYNECOLOGY

## 2018-06-13 PROCEDURE — 25000003 PHARM REV CODE 250: Performed by: OBSTETRICS & GYNECOLOGY

## 2018-06-13 PROCEDURE — 00160 ANES PX NOSE&SINUS NOS: CPT | Mod: QZ,P2 | Performed by: NURSE ANESTHETIST, CERTIFIED REGISTERED

## 2018-06-13 PROCEDURE — 36000707: Performed by: OBSTETRICS & GYNECOLOGY

## 2018-06-13 PROCEDURE — 94799 UNLISTED PULMONARY SVC/PX: CPT

## 2018-06-13 PROCEDURE — 27201423 OPTIME MED/SURG SUP & DEVICES STERILE SUPPLY: Performed by: OBSTETRICS & GYNECOLOGY

## 2018-06-13 PROCEDURE — 36000706: Performed by: OBSTETRICS & GYNECOLOGY

## 2018-06-13 RX ORDER — HYDROCODONE BITARTRATE AND ACETAMINOPHEN 5; 325 MG/1; MG/1
1 TABLET ORAL EVERY 4 HOURS PRN
Status: DISCONTINUED | OUTPATIENT
Start: 2018-06-13 | End: 2018-06-13 | Stop reason: HOSPADM

## 2018-06-13 RX ORDER — IBUPROFEN 800 MG/1
800 TABLET ORAL EVERY 8 HOURS PRN
Qty: 30 TABLET | Refills: 0 | Status: SHIPPED | OUTPATIENT
Start: 2018-06-13 | End: 2018-06-22

## 2018-06-13 RX ORDER — HYDROCODONE BITARTRATE AND ACETAMINOPHEN 5; 325 MG/1; MG/1
1 TABLET ORAL EVERY 6 HOURS PRN
Qty: 12 TABLET | Refills: 0 | Status: SHIPPED | OUTPATIENT
Start: 2018-06-13 | End: 2018-06-22

## 2018-06-13 RX ORDER — HYDROMORPHONE HYDROCHLORIDE 2 MG/ML
1 INJECTION, SOLUTION INTRAMUSCULAR; INTRAVENOUS; SUBCUTANEOUS EVERY 4 HOURS PRN
Status: DISCONTINUED | OUTPATIENT
Start: 2018-06-13 | End: 2018-06-13 | Stop reason: HOSPADM

## 2018-06-13 RX ORDER — AMOXICILLIN 250 MG
1 CAPSULE ORAL 2 TIMES DAILY
Status: DISCONTINUED | OUTPATIENT
Start: 2018-06-13 | End: 2018-06-13 | Stop reason: HOSPADM

## 2018-06-13 RX ORDER — DIPHENHYDRAMINE HCL 25 MG
25 CAPSULE ORAL EVERY 4 HOURS PRN
Status: DISCONTINUED | OUTPATIENT
Start: 2018-06-13 | End: 2018-06-13 | Stop reason: HOSPADM

## 2018-06-13 RX ORDER — SODIUM CHLORIDE, SODIUM LACTATE, POTASSIUM CHLORIDE, CALCIUM CHLORIDE 600; 310; 30; 20 MG/100ML; MG/100ML; MG/100ML; MG/100ML
INJECTION, SOLUTION INTRAVENOUS CONTINUOUS PRN
Status: DISCONTINUED | OUTPATIENT
Start: 2018-06-13 | End: 2018-06-13

## 2018-06-13 RX ORDER — PROPOFOL 10 MG/ML
VIAL (ML) INTRAVENOUS
Status: DISCONTINUED | OUTPATIENT
Start: 2018-06-13 | End: 2018-06-13

## 2018-06-13 RX ORDER — ONDANSETRON 2 MG/ML
4 INJECTION INTRAMUSCULAR; INTRAVENOUS EVERY 6 HOURS PRN
Status: DISCONTINUED | OUTPATIENT
Start: 2018-06-13 | End: 2018-06-13 | Stop reason: HOSPADM

## 2018-06-13 RX ORDER — ONDANSETRON HYDROCHLORIDE 2 MG/ML
INJECTION, SOLUTION INTRAMUSCULAR; INTRAVENOUS
Status: DISCONTINUED | OUTPATIENT
Start: 2018-06-13 | End: 2018-06-13

## 2018-06-13 RX ORDER — GLYCOPYRROLATE 0.2 MG/ML
INJECTION INTRAMUSCULAR; INTRAVENOUS
Status: DISCONTINUED | OUTPATIENT
Start: 2018-06-13 | End: 2018-06-13

## 2018-06-13 RX ORDER — FENTANYL CITRATE 50 UG/ML
INJECTION, SOLUTION INTRAMUSCULAR; INTRAVENOUS
Status: DISCONTINUED | OUTPATIENT
Start: 2018-06-13 | End: 2018-06-13

## 2018-06-13 RX ORDER — LIDOCAINE HYDROCHLORIDE 20 MG/ML
INJECTION, SOLUTION EPIDURAL; INFILTRATION; INTRACAUDAL; PERINEURAL
Status: DISCONTINUED | OUTPATIENT
Start: 2018-06-13 | End: 2018-06-13

## 2018-06-13 RX ORDER — HYDROCODONE BITARTRATE AND ACETAMINOPHEN 10; 325 MG/1; MG/1
1 TABLET ORAL EVERY 4 HOURS PRN
Status: DISCONTINUED | OUTPATIENT
Start: 2018-06-13 | End: 2018-06-13 | Stop reason: HOSPADM

## 2018-06-13 RX ORDER — SODIUM CHLORIDE, SODIUM LACTATE, POTASSIUM CHLORIDE, CALCIUM CHLORIDE 600; 310; 30; 20 MG/100ML; MG/100ML; MG/100ML; MG/100ML
INJECTION, SOLUTION INTRAVENOUS CONTINUOUS
Status: DISCONTINUED | OUTPATIENT
Start: 2018-06-13 | End: 2018-06-13 | Stop reason: HOSPADM

## 2018-06-13 RX ORDER — DEXAMETHASONE SODIUM PHOSPHATE 4 MG/ML
INJECTION, SOLUTION INTRA-ARTICULAR; INTRALESIONAL; INTRAMUSCULAR; INTRAVENOUS; SOFT TISSUE
Status: DISCONTINUED | OUTPATIENT
Start: 2018-06-13 | End: 2018-06-13

## 2018-06-13 RX ORDER — MIDAZOLAM HYDROCHLORIDE 1 MG/ML
INJECTION INTRAMUSCULAR; INTRAVENOUS
Status: DISCONTINUED | OUTPATIENT
Start: 2018-06-13 | End: 2018-06-13

## 2018-06-13 RX ADMIN — ONDANSETRON 4 MG: 2 INJECTION, SOLUTION INTRAMUSCULAR; INTRAVENOUS at 11:06

## 2018-06-13 RX ADMIN — PROPOFOL 80 MG: 10 INJECTION, EMULSION INTRAVENOUS at 11:06

## 2018-06-13 RX ADMIN — FENTANYL CITRATE 50 MCG: 50 INJECTION, SOLUTION INTRAMUSCULAR; INTRAVENOUS at 11:06

## 2018-06-13 RX ADMIN — PROPOFOL 40 MG: 10 INJECTION, EMULSION INTRAVENOUS at 11:06

## 2018-06-13 RX ADMIN — LIDOCAINE HYDROCHLORIDE 75 MG: 20 INJECTION, SOLUTION EPIDURAL; INFILTRATION; INTRACAUDAL; PERINEURAL at 11:06

## 2018-06-13 RX ADMIN — DEXAMETHASONE SODIUM PHOSPHATE 8 MG: 4 INJECTION, SOLUTION INTRAMUSCULAR; INTRAVENOUS at 11:06

## 2018-06-13 RX ADMIN — PROPOFOL 30 MG: 10 INJECTION, EMULSION INTRAVENOUS at 11:06

## 2018-06-13 RX ADMIN — FENTANYL CITRATE 25 MCG: 50 INJECTION, SOLUTION INTRAMUSCULAR; INTRAVENOUS at 11:06

## 2018-06-13 RX ADMIN — SODIUM CHLORIDE, SODIUM LACTATE, POTASSIUM CHLORIDE, AND CALCIUM CHLORIDE: .6; .31; .03; .02 INJECTION, SOLUTION INTRAVENOUS at 10:06

## 2018-06-13 RX ADMIN — HYDROCODONE BITARTRATE AND ACETAMINOPHEN 1 TABLET: 10; 325 TABLET ORAL at 12:06

## 2018-06-13 RX ADMIN — DOCUSATE SODIUM AND SENNOSIDES 1 TABLET: 8.6; 5 TABLET, FILM COATED ORAL at 12:06

## 2018-06-13 RX ADMIN — GLYCOPYRROLATE 0.4 MG: 0.2 INJECTION INTRAMUSCULAR; INTRAVENOUS at 11:06

## 2018-06-13 RX ADMIN — MIDAZOLAM HYDROCHLORIDE 2 MG: 1 INJECTION, SOLUTION INTRAMUSCULAR; INTRAVENOUS at 11:06

## 2018-06-13 NOTE — TRANSFER OF CARE
"Anesthesia Transfer of Care Note    Patient: Nolvia Garcia    Procedure(s) Performed: Procedure(s) (LRB):  DILATION AND CURETTAGE, UTERUS (N/A)  ABLATION, ENDOMETRIUM, THERMAL (N/A)    Patient location: PACU    Anesthesia Type: general    Transport from OR: Transported from OR on room air with adequate spontaneous ventilation    Post pain: adequate analgesia    Post assessment: no apparent anesthetic complications and tolerated procedure well    Post vital signs: stable    Level of consciousness: sedated    Nausea/Vomiting: no nausea/vomiting    Complications: none    Transfer of care protocol was followed      Last vitals:   Visit Vitals  /77 (Patient Position: Lying)   Pulse 73   Temp 36.2 °C (97.2 °F) (Oral)   Resp 16   Ht 4' 11" (1.499 m)   Wt 85.3 kg (188 lb)   SpO2 97%   Breastfeeding? No   BMI 37.97 kg/m²     "

## 2018-06-13 NOTE — ANESTHESIA POSTPROCEDURE EVALUATION
"Anesthesia Post Evaluation    Patient: Nolvia Garcia    Procedure(s) Performed: Procedure(s) (LRB):  DILATION AND CURETTAGE, UTERUS (N/A)  ABLATION, ENDOMETRIUM, THERMAL (N/A)    Final Anesthesia Type: general  Patient location during evaluation: PACU  Patient participation: Yes- Able to Participate  Level of consciousness: awake and alert and oriented  Post-procedure vital signs: reviewed and stable  Pain management: adequate  Airway patency: patent  PONV status at discharge: No PONV  Anesthetic complications: no      Cardiovascular status: blood pressure returned to baseline and hemodynamically stable  Respiratory status: unassisted, spontaneous ventilation and room air  Hydration status: euvolemic  Follow-up not needed.        Visit Vitals  /78 (BP Location: Right arm, Patient Position: Lying)   Pulse 77   Temp 36.2 °C (97.2 °F) (Oral)   Resp 18   Ht 4' 11" (1.499 m)   Wt 85.3 kg (188 lb)   SpO2 96%   Breastfeeding? No   BMI 37.97 kg/m²       Pain/Zaynab Score: Pain Assessment Performed: Yes (6/13/2018 11:57 AM)  Presence of Pain: denies (6/13/2018 11:57 AM)  Zaynab Score: 10 (6/13/2018 11:57 AM)      "

## 2018-06-13 NOTE — TRANSFER OF CARE
"Anesthesia Transfer of Care Note    Patient: Nolvia Garcia    Procedure(s) Performed: Procedure(s) (LRB):  DILATION AND CURETTAGE, UTERUS (N/A)  ABLATION, ENDOMETRIUM, THERMAL (N/A)    Patient location: PACU    Anesthesia Type: general    Transport from OR: Transported from OR on 6-10 L/min O2 by face mask with adequate spontaneous ventilation    Post pain: adequate analgesia    Post assessment: no apparent anesthetic complications and tolerated procedure well    Post vital signs: stable    Level of consciousness: sedated    Nausea/Vomiting: no nausea/vomiting    Complications: none    Transfer of care protocol was followed      Last vitals:   Visit Vitals  /77 (Patient Position: Lying)   Pulse 73   Temp 36.2 °C (97.2 °F) (Oral)   Resp 16   Ht 4' 11" (1.499 m)   Wt 85.3 kg (188 lb)   SpO2 97%   Breastfeeding? No   BMI 37.97 kg/m²     "

## 2018-06-13 NOTE — BRIEF OP NOTE
Brief Operative Note     SUMMARY     Surgery Date: 6/13/2018     Surgeon(s) and Role:     * Lesly Mendoza MD - Primary    Pre-op Diagnosis:  Thickened endometrium [R93.8]  Menorrhagia with irregular cycle [N92.1]    Post-op Diagnosis:  same    Procedure(s) (LRB):  DILATION AND CURETTAGE, UTERUS (N/A)  ABLATION, ENDOMETRIUM, THERMAL (N/A)--Novasure    Anesthesia: General/MAC    Findings/Key Components:  Uterus sounded to 9 cm.  Cervical length 4 cm.  Uterine cavity length 5 cm.  Moderate amount of polypoid appearing tissue noted on curettage.  Uterine cavity width 3.6 cm.  Ablation power 109 valero.  Total ablation time 51 seconds.    Estimated Blood Loss: < 5 mL           Specimens     Start     Ordered    06/13/18 1134  Specimen to Pathology - Surgery  Once      06/13/18 1136          Discharge Note      SUMMARY     Admit Date: 6/13/2018    Attending Physician: Lesly Mendoza MD    Discharge Physician: Lesly Mendoza MD    Discharge Date: 6/13/2018     Admit Diagnosis:  Thickened endometrium [R93.8]  Menorrhagia with irregular cycle [N92.1]    Final Diagnosis: Same    Procedure: Procedure(s) (LRB):  DILATION AND CURETTAGE, UTERUS (N/A)  ABLATION, ENDOMETRIUM, THERMAL (N/A)-Novasure    Disposition: Home or Self Care    Condition: Stable    Hospital Course: Pt presented for scheduled Procedure(s) (LRB):  DILATION AND CURETTAGE, UTERUS (N/A)  ABLATION, ENDOMETRIUM, THERMAL (N/A)/Novasure which she underwent without complications.  Her postoperative course was uncomplicated and she met all discharge criteria on postoperative day #0.    Patient Instructions:   Discharge Medication List as of 6/13/2018  2:57 PM      START taking these medications    Details   HYDROcodone-acetaminophen (NORCO) 5-325 mg per tablet Take 1 tablet by mouth every 6 (six) hours as needed for Pain., Starting Wed 6/13/2018, Normal      ibuprofen (ADVIL,MOTRIN) 800 MG tablet Take 1 tablet (800 mg total) by mouth every 8 (eight) hours as needed for  pain/cramping, Starting Wed 6/13/2018, Normal         CONTINUE these medications which have NOT CHANGED    Details   sumatriptan (IMITREX) 100 MG tablet Take one tablet at onset of headache. May repeat in 2 hours if needed. Max of 2 tabs in 24 hours, Normal      topiramate (TOPAMAX) 50 MG tablet One daily for headache prevention, Normal             Discharge Procedure Orders (must include Diet, Follow-up, Activity)    Discharge Procedure Orders (must include Diet, Follow-up, Activity)  Diet general     Other restrictions (specify):   Order Comments: No lifting >20lbs, pelvic rest, no driving while on narcotics     Call MD for:  temperature >100.4     Call MD for:  persistent nausea and vomiting     Call MD for:  severe uncontrolled pain     Call MD for:  difficulty breathing, headache or visual disturbances     Call MD for:  redness, tenderness, or signs of infection (pain, swelling, redness, odor or green/yellow discharge around incision site)     Call MD for:  hives     Call MD for:  persistent dizziness or light-headedness     Call MD for:  extreme fatigue     Call MD for:     Wound care routine (specify)   Order Comments: Wound care routine:   1. Keep clean and dry  2. Showers only  3. No creams/ointment  4. Remove soiled sterisrips          Activity:  Pelvic rest; otherwise, as tolerated    Diet:  Regular    Follow-up Information     Lesly Mendoza MD In 2 weeks.    Specialty:  Obstetrics  Why:  postop check  Contact information:  [address] [place] [id number]  [phone]

## 2018-06-13 NOTE — DISCHARGE INSTRUCTIONS
Discharge Instructions for Dilation and Curettage (D and C)  Your doctor performed dilation and curettage (D&C). The reasons for having this procedure vary from person to person. The D&C may be done to control heavy uterine bleeding, to find the cause of irregular bleeding, to perform an , or to remove pregnancy tissue if you have had a miscarriage.  Home care  · Take it easy. Rest for 2 days as needed.  · Return to your normal activities after 24 to 48 hours. You may also return to work at that time.  · Eat a normal diet.  · Take an over-the-counter pain reliever for pain, if needed.  · Remember, its OK to have bleeding for about a week after the procedure. The amount of bleeding should be similar to what you have during a normal period.  · Dont drive for 24 hours after the procedure unless specifically told by your provider that it is OK to do so.  · Dont have sexual intercourse or use tampons or douches until your doctor says its safe to do so.  Follow-up  · Make a follow-up appointment as directed by our staff.     When to call your doctor  Call your doctor right away if you have any of the following:  · Bleeding that soaks more than one sanitary pad in one hour  · Severe abdominal pain  · Severe cramps  · Fever above 100.4°F (38.0°C)  · A foul smelling vaginal discharge   Date Last Reviewed: 2015  © 3432-9418 [employer]. [address] [place] [address], [place] [address]. All rights reserved. This information is not intended as a substitute for professional medical care. Always follow your healthcare professional's instructions.

## 2018-06-14 NOTE — OP NOTE
DATE OF PROCEDURE:  06/13/2018    PREOPERATIVE DIAGNOSES:  1.  Menorrhagia with irregular cycle.  2.  Thickened endometrium.    POSTOPERATIVE DIAGNOSES:  1.  Menorrhagia with irregular cycle.  2.  Thickened endometrium.    PROCEDURES:  1.  Dilation and curettage of the uterus.  2.  NovaSure endometrial ablation.    SURGEON:  Lesly Mendoza M.D.    ANESTHESIA:  General/MAC.    COMPLICATIONS:  None.    ESTIMATED BLOOD LOSS:  Less than 5 mL.    URINE OUTPUT:  Approximately 120 mL of clear urine drained at the beginning of   the procedure.    INDICATIONS:  Ms. Garcia is a 42-year-old female with recent menorrhagia with   irregular cycle, who was noted to have a thickened endometrium on ultrasound.    Bleeding had progressively increased requiring presentation to the Emergency   Department a few days prior to procedure.    FINDINGS:  Uterus sounded to 9 cm.  Cervical length was 4 cm.  Uterine cavity   length was 5 cm.  There was a moderate amount of polypoid-appearing tissue noted   on curettage.  The uterine cavity width was 3.6 cm.  Ablation power was 109   valero.  Total ablation time was 51 seconds.    PROCEDURE IN DETAIL:  The patient was taken to the Operating Room where   general/MAC anesthesia was administered without complication.  She was placed in   the dorsal lithotomy position in adjustable Yellofin stirrups.  The patient's   lower abdomen and perineum were prepped in the normal sterile fashion.  A red   rubber catheter was used to drain the patient's bladder, resulting in   approximately 120 mL of clear urine.  A sterile weighted speculum was placed   inside the patient's vagina.  The anterior lip of the cervix was grasped with an   Allis clamp.  The uterus was sounded to 9 cm.  The cervical length was   determined to be 4 cm, which resulted in the uterine cavity length of 5 cm.  The   cervix was slightly dilated already, but additional dilation was performed.  A   sharp curettage was performed until a gritty  texture was noted throughout the   uterus.  There was a moderate amount of polypoid-appearing tissue that was noted   on the curettage.  Next, the uterus was sounded again and noted to be 9 cm.    The NovaSure ablation device was inserted into the patient's uterus and proper   seating was performed according to the 's instructions.  Uterine   cavity width was determined to be 3.6 cm.  Cavity assessment was performed and   passed.  The ablation was initiated at a power of 109 valero.  Total ablation   time was 51 seconds.  The NovaSure ablation device was then withdrawn from the   patient's uterus.  The ablation device was noted to be appropriately charred.    The uterus was sounded once again to 9 cm.  All instruments were then removed   from the patient's vagina.  Sponge, lap, needle, and instrument counts were   correct x2 and the patient was taken to the recovery area awake and in stable   condition.      ELM/HN  dd: 06/13/2018 17:34:29 (CDT)  td: 06/13/2018 19:00:42 (CDT)  Doc ID   #[id number]  Job ID #[id number]    CC:

## 2018-06-22 ENCOUNTER — OFFICE VISIT (OUTPATIENT)
Dept: OBSTETRICS AND GYNECOLOGY | Facility: CLINIC | Age: 43
End: 2018-06-22
Payer: MEDICAID

## 2018-06-22 VITALS
DIASTOLIC BLOOD PRESSURE: 80 MMHG | HEIGHT: 59 IN | WEIGHT: 187 LBS | SYSTOLIC BLOOD PRESSURE: 122 MMHG | HEART RATE: 79 BPM | RESPIRATION RATE: 13 BRPM | BODY MASS INDEX: 37.7 KG/M2

## 2018-06-22 DIAGNOSIS — N89.8 VAGINAL DISCHARGE: Primary | ICD-10-CM

## 2018-06-22 LAB
GARDNERELLA VAGINALIS: NEGATIVE
OTHER MICROSC. OBSERVATIONS: NORMAL
TRICHOMONAS, POC: NEGATIVE
YEAST WET PREP: NEGATIVE

## 2018-06-22 PROCEDURE — 99999 PR PBB SHADOW E&M-EST. PATIENT-LVL III: CPT | Mod: PBBFAC,,, | Performed by: OBSTETRICS & GYNECOLOGY

## 2018-06-22 PROCEDURE — 99213 OFFICE O/P EST LOW 20 MIN: CPT | Mod: S$PBB,24,, | Performed by: OBSTETRICS & GYNECOLOGY

## 2018-06-22 PROCEDURE — 99213 OFFICE O/P EST LOW 20 MIN: CPT | Mod: PBBFAC | Performed by: OBSTETRICS & GYNECOLOGY

## 2018-06-22 NOTE — PROGRESS NOTES
Subjective:       Patient ID: Nolvia Garcia is a 42 y.o. female.    Chief Complaint:  Vaginal Discharge (browninsh, had D&C 1 wk ago, odor)      History of Present Illness  The patient presents complaining of approximately 1 week of vaginal discharge. Patient had a D and C on 2018.  She has had the discharge ever since.  She denies any itching or irritation.  She states that as slight over but equivalent to old blood.  She is otherwise without gyn complaints.    Menstrual History:  OB History      Para Term  AB Living    4 2 2   2 2    SAB TAB Ectopic Multiple Live Births    2       2         Menarche age:  Patient's last menstrual period was 03/15/2018 (approximate).         Review of Systems  Review of Systems   Constitutional: Positive for activity change. Negative for appetite change, chills, diaphoresis, fatigue, fever and unexpected weight change.   HENT: Negative for congestion, dental problem, drooling, ear discharge, ear pain, facial swelling, hearing loss, mouth sores, nosebleeds, postnasal drip, rhinorrhea, sinus pressure, sneezing, sore throat, tinnitus, trouble swallowing and voice change.    Eyes: Negative for photophobia, pain, discharge, redness, itching and visual disturbance.   Respiratory: Negative for apnea, cough, choking, chest tightness, shortness of breath, wheezing and stridor.    Cardiovascular: Negative for chest pain, palpitations and leg swelling.   Gastrointestinal: Negative for abdominal distention, abdominal pain, anal bleeding, blood in stool, constipation, diarrhea, nausea, rectal pain and vomiting.   Endocrine: Negative for cold intolerance, heat intolerance, polydipsia, polyphagia and polyuria.   Genitourinary: Positive for vaginal discharge. Negative for decreased urine volume, difficulty urinating, dyspareunia, dysuria, enuresis, flank pain, frequency, genital sores, hematuria, menstrual problem, pelvic pain, urgency, vaginal bleeding and vaginal  pain.   Musculoskeletal: Negative for arthralgias, back pain, gait problem, joint swelling, myalgias, neck pain and neck stiffness.   Skin: Negative for color change, pallor, rash and wound.   Allergic/Immunologic: Negative for environmental allergies, food allergies and immunocompromised state.   Neurological: Negative for dizziness, tremors, seizures, syncope, facial asymmetry, speech difficulty, weakness, light-headedness, numbness and headaches.   Hematological: Negative for adenopathy. Does not bruise/bleed easily.   Psychiatric/Behavioral: Negative for agitation, behavioral problems, confusion, decreased concentration, dysphoric mood, hallucinations, self-injury, sleep disturbance and suicidal ideas. The patient is not nervous/anxious and is not hyperactive.            Objective:    Physical Exam   Genitourinary: Rectal exam shows no external hemorrhoid. No labial fusion. There is no rash, tenderness, lesion or injury on the right labia. There is no rash, tenderness, lesion or injury on the left labia. Uterus is not deviated, not enlarged, not fixed and not tender. Cervix exhibits no motion tenderness, no discharge and no friability. Right adnexum displays no mass, no tenderness and no fullness. Left adnexum displays no mass, no tenderness and no fullness. No erythema, tenderness or bleeding in the vagina. No foreign body in the vagina. No signs of injury around the vagina. Vaginal discharge found.   Nursing note and vitals reviewed.        Assessment:        1. Vaginal discharge                Plan:         Nolvia was seen today for vaginal discharge.    Diagnoses and all orders for this visit:    Vaginal discharge  -     POCT WET PREP

## 2018-06-27 ENCOUNTER — OFFICE VISIT (OUTPATIENT)
Dept: OBSTETRICS AND GYNECOLOGY | Facility: CLINIC | Age: 43
End: 2018-06-27
Payer: MEDICAID

## 2018-06-27 VITALS
DIASTOLIC BLOOD PRESSURE: 72 MMHG | HEART RATE: 69 BPM | HEIGHT: 59 IN | WEIGHT: 189 LBS | RESPIRATION RATE: 18 BRPM | BODY MASS INDEX: 38.1 KG/M2 | SYSTOLIC BLOOD PRESSURE: 116 MMHG

## 2018-06-27 DIAGNOSIS — Z09 POSTOP CHECK: Primary | ICD-10-CM

## 2018-06-27 DIAGNOSIS — Z98.890 S/P D&C (STATUS POST DILATION AND CURETTAGE): ICD-10-CM

## 2018-06-27 DIAGNOSIS — Z98.890 S/P ENDOMETRIAL ABLATION: ICD-10-CM

## 2018-06-27 PROCEDURE — 99999 PR PBB SHADOW E&M-EST. PATIENT-LVL III: CPT | Mod: PBBFAC,,, | Performed by: OBSTETRICS & GYNECOLOGY

## 2018-06-27 PROCEDURE — 99024 POSTOP FOLLOW-UP VISIT: CPT | Mod: ,,, | Performed by: OBSTETRICS & GYNECOLOGY

## 2018-06-27 PROCEDURE — 99213 OFFICE O/P EST LOW 20 MIN: CPT | Mod: PBBFAC | Performed by: OBSTETRICS & GYNECOLOGY

## 2018-06-27 NOTE — PROGRESS NOTES
Subjective:    Patient ID: Nolvia Garcia is a 42 y.o.. Female.    Chief Complaint:   Chief Complaint   Patient presents with    Post-op Evaluation       History of Present Illness:   Nolvia presents today approximately 2 weeks Post Op D&C/Novasure ablation and is here for followup.  She has no complaints today.  However, she was seen last week due to vaginal discharge.  Vaginosis screen was negative at this time.  She still has some discharge though it has markedly lessened.  States it seems as though it may be old blood. She denies any active bleeding.   She is tolerating a regular diet with no nausea or vomiting.  She is voiding well and having normal bowel movements.  She denies fever. She denies increasing pain and is no longer requiring pain medication.      Pathology: benign; reviewed with patient    The following portions of the patient's history were reviewed and updated as appropriate: allergies, current medications, past family history, past medical history, past social history, past surgical history and problem list.      Objective:   Vital Signs:  Vitals:    06/27/18 1513   BP: 116/72   Pulse: 69   Resp: 18       Physical Exam:  General:  alert,normal appearing female   Pelvis: Deferred         Impresssion:  Encounter Diagnoses   Name Primary?    Postop check Yes    S/P D&C (status post dilation and curettage)     S/P endometrial ablation          Plan:  Postop check    S/P D&C (status post dilation and curettage)    S/P endometrial ablation        Return on  7/17/18 at which time repeat ultrasound to be performed to follow up ovarian cyst.   Reviewed postoperative precautions and instructions.  She verbalizes understanding

## 2018-08-03 ENCOUNTER — PROCEDURE VISIT (OUTPATIENT)
Dept: OBSTETRICS AND GYNECOLOGY | Facility: CLINIC | Age: 43
End: 2018-08-03
Payer: MEDICAID

## 2018-08-03 ENCOUNTER — OFFICE VISIT (OUTPATIENT)
Dept: OBSTETRICS AND GYNECOLOGY | Facility: CLINIC | Age: 43
End: 2018-08-03
Payer: MEDICAID

## 2018-08-03 VITALS
BODY MASS INDEX: 38.54 KG/M2 | HEIGHT: 59 IN | SYSTOLIC BLOOD PRESSURE: 122 MMHG | RESPIRATION RATE: 13 BRPM | WEIGHT: 191.19 LBS | HEART RATE: 82 BPM | DIASTOLIC BLOOD PRESSURE: 76 MMHG

## 2018-08-03 DIAGNOSIS — R93.89 THICKENED ENDOMETRIUM: ICD-10-CM

## 2018-08-03 DIAGNOSIS — N83.202 BILATERAL OVARIAN CYSTS: Primary | ICD-10-CM

## 2018-08-03 DIAGNOSIS — N83.201 RIGHT OVARIAN CYST: ICD-10-CM

## 2018-08-03 DIAGNOSIS — N92.1 MENORRHAGIA WITH IRREGULAR CYCLE: ICD-10-CM

## 2018-08-03 DIAGNOSIS — N83.201 BILATERAL OVARIAN CYSTS: Primary | ICD-10-CM

## 2018-08-03 PROCEDURE — 76830 TRANSVAGINAL US NON-OB: CPT | Mod: 26,S$PBB,, | Performed by: OBSTETRICS & GYNECOLOGY

## 2018-08-03 PROCEDURE — 99213 OFFICE O/P EST LOW 20 MIN: CPT | Mod: PBBFAC | Performed by: OBSTETRICS & GYNECOLOGY

## 2018-08-03 PROCEDURE — 99999 PR PBB SHADOW E&M-EST. PATIENT-LVL III: CPT | Mod: PBBFAC,,, | Performed by: OBSTETRICS & GYNECOLOGY

## 2018-08-03 PROCEDURE — 76830 TRANSVAGINAL US NON-OB: CPT | Mod: PBBFAC | Performed by: OBSTETRICS & GYNECOLOGY

## 2018-08-03 PROCEDURE — 99213 OFFICE O/P EST LOW 20 MIN: CPT | Mod: S$PBB,25,, | Performed by: OBSTETRICS & GYNECOLOGY

## 2018-08-03 NOTE — PROGRESS NOTES
Subjective:    Patient ID: Nolvia Garcia is a 43 y.o. y.o. female.     Chief Complaint:   Chief Complaint   Patient presents with    Post-op Evaluation       History of Present Illness   Nolvia presents today to follow up right ovarian cyst.  She had a D&C with Novasure ablation in June.  Prior to ablation, endometrium noted to be thickened and right ovarian cyst noted.  Has not had any bleeding until yesterday since her D&C/ablation.  Bleeding has been light.  Had associated headache.  Bleeding is light and mostly with wiping.  States she has not been having any significant pelvic pain but does feel a slight discomfort every now and then.  Ultrasound today reveals decrease in size of the right ovarian cyst.  Both the right and left ovaries contain a 1.42 cm simple appearing follicle/cyst.    The following portions of the patient's history were reviewed and updated as appropriate: allergies, current medications, past family history, past medical history, past social history, past surgical history and problem list.    Review of Systems   All other systems reviewed and are negative.        Objective:    Vital Signs:  Vitals:    08/03/18 1427   BP: 122/76   Pulse: 82   Resp: 13       Physical Exam:  General:  alert; oriented; well-nourished female   Skin:  Skin color, texture, turgor normal. No rashes or lesions   Abdomen:  soft, non-tender. Bowel sounds normal. No masses,  no organomegaly   Pelvis: Deferred         Assessment:      1. Bilateral ovarian cysts          Plan:      Bilateral ovarian cysts  -     US OB/GYN Procedure ([facility]) - Extended List; Future    Reviewed today's ultrasound findings with patient and compared today's images with prior images.  Counseled on further evaluation and management options.   Will continue surveillance with ultrasound in 2-3 months or sooner PRN.  Pain, bleeding precautions.

## 2018-08-29 ENCOUNTER — OFFICE VISIT (OUTPATIENT)
Dept: FAMILY MEDICINE | Facility: CLINIC | Age: 43
End: 2018-08-29
Payer: MEDICAID

## 2018-08-29 VITALS
RESPIRATION RATE: 20 BRPM | BODY MASS INDEX: 37.5 KG/M2 | HEART RATE: 88 BPM | HEIGHT: 59 IN | SYSTOLIC BLOOD PRESSURE: 130 MMHG | WEIGHT: 186 LBS | TEMPERATURE: 97 F | DIASTOLIC BLOOD PRESSURE: 72 MMHG

## 2018-08-29 DIAGNOSIS — J01.90 ACUTE SINUSITIS, RECURRENCE NOT SPECIFIED, UNSPECIFIED LOCATION: Primary | ICD-10-CM

## 2018-08-29 DIAGNOSIS — J04.0 LARYNGITIS: ICD-10-CM

## 2018-08-29 PROCEDURE — 99999 PR PBB SHADOW E&M-EST. PATIENT-LVL III: CPT | Mod: PBBFAC,,, | Performed by: FAMILY MEDICINE

## 2018-08-29 PROCEDURE — 96372 THER/PROPH/DIAG INJ SC/IM: CPT | Mod: PBBFAC

## 2018-08-29 PROCEDURE — 99213 OFFICE O/P EST LOW 20 MIN: CPT | Mod: PBBFAC | Performed by: FAMILY MEDICINE

## 2018-08-29 PROCEDURE — 99213 OFFICE O/P EST LOW 20 MIN: CPT | Mod: S$PBB,,, | Performed by: FAMILY MEDICINE

## 2018-08-29 RX ORDER — GUAIFENESIN, PSEUDOEPHEDRINE HYDROCHLORIDE 600; 60 MG/1; MG/1
1 TABLET, EXTENDED RELEASE ORAL 2 TIMES DAILY
Qty: 30 TABLET | Refills: 3 | Status: SHIPPED | OUTPATIENT
Start: 2018-08-29 | End: 2018-09-19

## 2018-08-29 RX ORDER — METHYLPREDNISOLONE ACETATE 40 MG/ML
60 INJECTION, SUSPENSION INTRA-ARTICULAR; INTRALESIONAL; INTRAMUSCULAR; SOFT TISSUE
Status: COMPLETED | OUTPATIENT
Start: 2018-08-29 | End: 2018-08-29

## 2018-08-29 RX ORDER — AZITHROMYCIN 250 MG/1
TABLET, FILM COATED ORAL
Qty: 6 TABLET | Refills: 0 | Status: SHIPPED | OUTPATIENT
Start: 2018-08-29 | End: 2018-09-19

## 2018-08-29 RX ADMIN — METHYLPREDNISOLONE ACETATE 60 MG: 40 INJECTION, SUSPENSION INTRA-ARTICULAR; INTRALESIONAL; INTRAMUSCULAR; SOFT TISSUE at 01:08

## 2018-08-29 NOTE — PROGRESS NOTES
Chief complaint: Sinus congestion    History of present illness: Pt is 43 y.o. female complaints of sinus congestion, facial pressure, sore throat, ear ache.  Patient states glands are swollen and neck.  Patient has a cough.  This started 5 days ago.  Patient denies chest pain, shortness of breath, fever.  Patient has itchy watery eyes, itchy scratchy throat.  The patient complains of decreased hearing.  Cough is nonproductive.  She is very hoarse.    Review of systems:  Constitutional-no weight loss, weight gain  HEENT-allergy symptoms such as itchy watery eyes, post nasal drip, itchy palate, come and go.  Respiratory-no wheezing, see history of present illness  Neurological-no weakness or numbness    Past medical history, family history, social history-same as note dated today    Medications-all reviewed and verified in nurses notes.    Physical exam: Vital signs-reviewed and verified in nurses notes  Gen.-alert, oriented, no apparent distress.  Coughing.  Head-positive facial tenderness over the frontal and maxillary sinuses  Eyes: Pupils equal round reactive to light and accommodation, extraocular muscles intact, conjunctiva clear  Ears: Tympanic membranes are clear and mobile, no fluid present.  Nose: Injected mucous membranes, erythematous, mucopurulent discharge  Throat:  Injected red streaky mucosa,  tonsils normal  Neck: Shotty, tender anterior lymphadenopathy  Heart: Regular rate and rhythm, no murmurs, rubs or gallops  Lungs:Lungs were clear to auscultation and percussion, and with normal diaphragmatic excursion. No wheezes or rales were noted.     Assessment/Plan:   Acute sinusitis, recurrence not specified, unspecified location  -     methylPREDNISolone acetate injection 60 mg; Inject 1.5 mLs (60 mg total) into the muscle one time.  -     azithromycin (Z-ROHAN) 250 MG tablet; 2 po day 1, then 1 po q day  Dispense: 6 tablet; Refill: 0  -     pseudoephedrine-guaifenesin  mg (MUCINEX D)  mg per  tablet; Take 1 tablet by mouth 2 (two) times daily.  Dispense: 30 tablet; Refill: 3    Laryngitis  -     methylPREDNISolone acetate injection 60 mg; Inject 1.5 mLs (60 mg total) into the muscle one time.  -     azithromycin (Z-ROHAN) 250 MG tablet; 2 po day 1, then 1 po q day  Dispense: 6 tablet; Refill: 0  -     pseudoephedrine-guaifenesin  mg (MUCINEX D)  mg per tablet; Take 1 tablet by mouth 2 (two) times daily.  Dispense: 30 tablet; Refill: 3      Sinusitis, acute  - azithromycin (ZITHROMAX) 500 MG tablet; Take 1 tablet (500 mg total) by mouth once daily.  - cetirizine (ZYRTEC) 10 MG tablet; Take 1 tablet (10 mg total) by mouth once daily.  - diphenhydrAMINE (BENADRYL) 25 mg capsule; Take 1 each (25 mg total) by mouth nightly as needed for Itching.  - methylPREDNISolone acetate injection 60 mg; Inject 1.5 mLs (60 mg total) into the muscle one time.    Simply saline nasal lavage q.2 to 3 hours p.r.n.  Avoid dust, allergens, other sinus irritants.  Handwashing technique discussed to prevent spreading germs.

## 2018-09-17 ENCOUNTER — HOSPITAL ENCOUNTER (EMERGENCY)
Facility: HOSPITAL | Age: 43
Discharge: HOME | End: 2018-09-18
Attending: SURGERY
Payer: MEDICAID

## 2018-09-17 DIAGNOSIS — R10.9 ABDOMINAL PAIN: ICD-10-CM

## 2018-09-17 LAB
AMPHET+METHAMPHET UR QL: NEGATIVE
B-HCG UR QL: NEGATIVE
BARBITURATES UR QL SCN>200 NG/ML: NEGATIVE
BENZODIAZ UR QL SCN>200 NG/ML: NEGATIVE
BILIRUB UR QL STRIP: NEGATIVE
BZE UR QL SCN: NEGATIVE
CANNABINOIDS UR QL SCN: NEGATIVE
CK MB SERPL-MCNC: 1.2 NG/ML
CK MB SERPL-RTO: 1.1 %
CK SERPL-CCNC: 105 U/L
CK SERPL-CCNC: 105 U/L
CLARITY UR: CLEAR
COLOR UR: YELLOW
CREAT UR-MCNC: 129.5 MG/DL
GLUCOSE UR QL STRIP: NEGATIVE
HGB UR QL STRIP: ABNORMAL
KETONES UR QL STRIP: NEGATIVE
LEUKOCYTE ESTERASE UR QL STRIP: NEGATIVE
LIPASE SERPL-CCNC: 22 U/L
METHADONE UR QL SCN>300 NG/ML: NEGATIVE
NITRITE UR QL STRIP: NEGATIVE
OPIATES UR QL SCN: NEGATIVE
PCP UR QL SCN>25 NG/ML: NEGATIVE
PH UR STRIP: 6 [PH] (ref 5–8)
PROT UR QL STRIP: NEGATIVE
SP GR UR STRIP: 1.02 (ref 1–1.03)
TOXICOLOGY INFORMATION: NORMAL
TROPONIN I SERPL DL<=0.01 NG/ML-MCNC: <0.006 NG/ML
URN SPEC COLLECT METH UR: ABNORMAL
UROBILINOGEN UR STRIP-ACNC: NEGATIVE EU/DL

## 2018-09-17 PROCEDURE — 99284 EMERGENCY DEPT VISIT MOD MDM: CPT | Mod: 25

## 2018-09-17 PROCEDURE — 36415 COLL VENOUS BLD VENIPUNCTURE: CPT

## 2018-09-17 PROCEDURE — 81003 URINALYSIS AUTO W/O SCOPE: CPT | Mod: 59

## 2018-09-17 PROCEDURE — 84484 ASSAY OF TROPONIN QUANT: CPT

## 2018-09-17 PROCEDURE — 63600175 PHARM REV CODE 636 W HCPCS: Performed by: SURGERY

## 2018-09-17 PROCEDURE — 80307 DRUG TEST PRSMV CHEM ANLYZR: CPT

## 2018-09-17 PROCEDURE — 82553 CREATINE MB FRACTION: CPT

## 2018-09-17 PROCEDURE — 83690 ASSAY OF LIPASE: CPT

## 2018-09-17 PROCEDURE — 93010 ELECTROCARDIOGRAM REPORT: CPT | Mod: ,,, | Performed by: INTERNAL MEDICINE

## 2018-09-17 PROCEDURE — 81025 URINE PREGNANCY TEST: CPT

## 2018-09-17 PROCEDURE — 93005 ELECTROCARDIOGRAM TRACING: CPT

## 2018-09-17 PROCEDURE — 96372 THER/PROPH/DIAG INJ SC/IM: CPT | Mod: 59

## 2018-09-17 PROCEDURE — 82550 ASSAY OF CK (CPK): CPT

## 2018-09-17 RX ORDER — MORPHINE SULFATE 2 MG/ML
2 INJECTION, SOLUTION INTRAMUSCULAR; INTRAVENOUS
Status: COMPLETED | OUTPATIENT
Start: 2018-09-17 | End: 2018-09-17

## 2018-09-17 RX ORDER — ONDANSETRON 2 MG/ML
4 INJECTION INTRAMUSCULAR; INTRAVENOUS
Status: COMPLETED | OUTPATIENT
Start: 2018-09-17 | End: 2018-09-17

## 2018-09-17 RX ADMIN — MORPHINE SULFATE 2 MG: 2 INJECTION, SOLUTION INTRAMUSCULAR; INTRAVENOUS at 11:09

## 2018-09-17 RX ADMIN — ONDANSETRON 4 MG: 2 INJECTION INTRAMUSCULAR; INTRAVENOUS at 11:09

## 2018-09-18 ENCOUNTER — TELEPHONE (OUTPATIENT)
Dept: OBSTETRICS AND GYNECOLOGY | Facility: CLINIC | Age: 43
End: 2018-09-18

## 2018-09-18 VITALS
OXYGEN SATURATION: 97 % | SYSTOLIC BLOOD PRESSURE: 112 MMHG | RESPIRATION RATE: 16 BRPM | WEIGHT: 187.63 LBS | HEART RATE: 58 BPM | BODY MASS INDEX: 37.89 KG/M2 | TEMPERATURE: 96 F | DIASTOLIC BLOOD PRESSURE: 68 MMHG

## 2018-09-18 RX ORDER — ONDANSETRON 4 MG/1
4 TABLET, ORALLY DISINTEGRATING ORAL EVERY 8 HOURS PRN
Qty: 20 TABLET | Refills: 0 | Status: SHIPPED | OUTPATIENT
Start: 2018-09-18 | End: 2018-09-19

## 2018-09-18 RX ORDER — DICYCLOMINE HYDROCHLORIDE 20 MG/1
20 TABLET ORAL 4 TIMES DAILY PRN
Qty: 15 TABLET | Refills: 0 | Status: SHIPPED | OUTPATIENT
Start: 2018-09-18 | End: 2018-09-19 | Stop reason: SDDI

## 2018-09-18 NOTE — ED PROVIDER NOTES
[facility]                                                 Chief Complaint  43 y.o. female with Abdominal Pain    History of Present Illness  Nolvia Garcia presents to the emergency room with diarrhea tonight  Patient has had abdominal cramps and diarrhea for last couple days now  Patient on exam has a soft flat abdomen, no signs of peritonitis or rebound  Patient denies any fever or weight loss, denies any hematuria or dysuria  Patient has no signs of distress, afebrile with good stable vital signs noted    The history is provided by the patient   device was not used during this ER visit  Medical history: Anxiety and GERD  Surgical history: Appendectomy and tubal ligation  No Known Allergies     Review of Systems and Physical Exam      Review of Systems  -- Constitution - no fever, denies fatigue, no weakness, no chills  -- Eyes - no tearing or redness, no visual disturbance  -- Ear, Nose - no tinnitus or earache, no nasal congestion or discharge  -- Mouth,Throat - no sore throat, no toothache, normal voice, normal swallowing  -- Respiratory - denies cough and congestion, no shortness of breath, no SALAS  -- Cardiovascular - denies chest pain, no palpitations, denies claudication  -- Gastrointestinal - abdominal pain, nausea, vomiting, diarrhea  -- Genitourinary - no dysuria, no hematuria, no flank pain, no bladder pain  -- Musculoskeletal - denies back pain, negative for myalgias and arthralgias   -- Neurological - no headache, denies weakness or seizure; no LOC  -- Skin - denies pallor, rash, or changes in skin. no hives or welts noted    Vital Signs  Her oral temperature is 96.4 °F (35.8 °C).   Her blood pressure is 112/68 and her pulse is 58  Her respiration is 16 and oxygen saturation is 97%.     Physical Exam  -- Nursing note and vitals reviewed  -- Constitutional: Appears well-developed and well-nourished  -- Head: Atraumatic. Normocephalic. No obvious  abnormality  -- Eyes: Pupils are equal and reactive to light. Normal conjunctiva and lids  -- Cardiac: Normal rate, regular rhythm and normal heart sounds  -- Pulmonary: Normal respiratory effort, breath sounds clear to auscultation  -- Abdominal: Soft, no tenderness. Normal bowel sounds. Normal liver edge  -- Genitourinary: no flank pain on exam, no suprapubic pain by palpation   -- Musculoskeletal: Normal range of motion, no effusions. Joints stable   -- Neurological: No focal deficits. Showed good interaction with staff  -- Skin: Warm and dry. No evidence of rash or cellulitis    Emergency Room Course      Lab Results     K 3.9      CO2 25   BUN 14   CREATININE 0.8   GLU 93   ALKPHOS 87   AST 13   ALT 15   BILITOT 0.7   ALBUMIN 3.5   PROT 7.1   WBC 6.33   HGB 11.5 (L)   HCT 35.3 (L)    (H)     Urinalysis  -- Urinalysis performed during this ER visit showed no signs of infection    -- The urine pregnancy test today was negative; patient informed of the results     Radiology  Negative for acute process involving the abdomen or pelvis.  Negative for inflammatory changes.  Negative for renal stone disease or hydronephrosis.  Patient is status post appendectomy.   There is an exophytic indeterminate mass involving the posterior left mid kidney, measuring 1.9 cm.  Further characterization with a three-phase kidney CT scan, or kidney MRI, is recommended.     Medications Given  morphine injection 2 mg (2 mg Intramuscular Given 9/17/18 2352)   ondansetron injection 4 mg (4 mg Intramuscular Given 9/17/18 2352)     Diagnosis  -- The encounter diagnosis was Abdominal pain.    Disposition and Plan  -- Disposition: home  -- Condition: stable  -- Follow-up: Patient to follow up with Lesly Mendoza MD in 1-2 days.  -- I advised the patient that we have found no life threatening condition today  -- At this time, I believe the patient is clinically stable for discharge.   -- The patient acknowledges that close  follow up with a MD is required   -- Patient agrees to comply with all instruction and direction given in the ER    This note is dictated on Dragon Natural Speaking word recognition program.  There are word recognition mistakes that are occasionally missed on review.          Peyman Cortez MD  09/18/18 0557

## 2018-09-18 NOTE — TELEPHONE ENCOUNTER
----- Message from Ilsa Verma MA sent at 9/18/2018  2:55 PM CDT -----  Contact: self  Nolvia Garcia  MRN: [id number]  Home Phone      [phone]  Work Phone      Not on file.  Mobile          [phone]    Patient Care Team:  Lesly Mendoza MD as PCP - General (Obstetrics)  OB? No  What phone number can you be reached at?   [phone]  Message:  Needs to discuss abd pain she is having.

## 2018-09-18 NOTE — ED TRIAGE NOTES
"Pt reports intermittent sharp upper abd pain starting tonight. Reports hx of same episodes but tonight was "the worse I ever had it". Denies n/v/d  "

## 2018-09-19 ENCOUNTER — TELEPHONE (OUTPATIENT)
Dept: UROLOGY | Facility: CLINIC | Age: 43
End: 2018-09-19

## 2018-09-19 ENCOUNTER — OFFICE VISIT (OUTPATIENT)
Dept: OBSTETRICS AND GYNECOLOGY | Facility: CLINIC | Age: 43
End: 2018-09-19
Payer: MEDICAID

## 2018-09-19 VITALS
WEIGHT: 185 LBS | HEIGHT: 59 IN | HEART RATE: 82 BPM | DIASTOLIC BLOOD PRESSURE: 68 MMHG | RESPIRATION RATE: 14 BRPM | SYSTOLIC BLOOD PRESSURE: 118 MMHG | BODY MASS INDEX: 37.29 KG/M2

## 2018-09-19 DIAGNOSIS — N92.1 MENORRHAGIA WITH IRREGULAR CYCLE: ICD-10-CM

## 2018-09-19 DIAGNOSIS — R10.2 PELVIC PAIN IN FEMALE: Primary | ICD-10-CM

## 2018-09-19 DIAGNOSIS — N89.8 VAGINAL DISCHARGE: ICD-10-CM

## 2018-09-19 DIAGNOSIS — N94.9 VAGINAL DISCOMFORT: ICD-10-CM

## 2018-09-19 DIAGNOSIS — Z01.818 PREOPERATIVE TESTING: ICD-10-CM

## 2018-09-19 DIAGNOSIS — N28.89 RENAL MASS, LEFT: ICD-10-CM

## 2018-09-19 PROCEDURE — 99215 OFFICE O/P EST HI 40 MIN: CPT | Mod: PBBFAC | Performed by: OBSTETRICS & GYNECOLOGY

## 2018-09-19 PROCEDURE — 99214 OFFICE O/P EST MOD 30 MIN: CPT | Mod: S$PBB,,, | Performed by: OBSTETRICS & GYNECOLOGY

## 2018-09-19 PROCEDURE — 87660 TRICHOMONAS VAGIN DIR PROBE: CPT

## 2018-09-19 PROCEDURE — 99999 PR PBB SHADOW E&M-EST. PATIENT-LVL V: CPT | Mod: PBBFAC,,, | Performed by: OBSTETRICS & GYNECOLOGY

## 2018-09-19 RX ORDER — HYDROCODONE BITARTRATE AND ACETAMINOPHEN 5; 325 MG/1; MG/1
1 TABLET ORAL EVERY 6 HOURS PRN
Qty: 10 TABLET | Refills: 0 | Status: ON HOLD | OUTPATIENT
Start: 2018-09-19 | End: 2018-12-23 | Stop reason: HOSPADM

## 2018-09-19 NOTE — TELEPHONE ENCOUNTER
----- Message from Trupti Hopson LPN sent at 9/19/2018  1:40 PM CDT -----  Contact: self  [phone]      ----- Message -----  From: Za Garcia MA  Sent: 9/19/2018  12:32 PM  To: Bethany LINDA Jr Staff    Patient Requesting Sooner Appointment.     Reason for sooner appt.:  [facility]oma called to schedule and appointment with him in [place].  When is the first available appointment?  Nurse to schedule - medicaid patient and referral is in epic  Communication Preference:  Phone# above  Additional Information:  Nothing comes up since it is medicaid

## 2018-09-19 NOTE — H&P
Subjective:    Patient ID: Nolvia Garcia is a 43 y.o. y.o. female.     Chief Complaint:   Chief Complaint   Patient presents with    Pelvic Pain     approx 2 wks, left sided       History of Present Illness   Nolvia presents today complaining of left lower abdominal/pelvic pain. Reports left lower abdominal pain that has been present for ~ 2 weeks.  She describes it as a cramping.  She started her menstrual cycle last week.  This was her first one since her ablation in June.  States bleeding was heavy but only lasted 2 days.  Pain persisted even after her cycle.  States she had a clear discharge that has been present since her ablation. Discharge is associated with odor. She was seen in ED 2 days ago and had CT scan which only revealed a 1.9 cm exophytic indeterminate mass involving the posterior left mid kidney.  She denies any urinary complaints.       Past Medical History:   Diagnosis Date    Abnormal Pap smear of cervix     Anxiety     GERD (gastroesophageal reflux disease)     Migraines      Past Surgical History:   Procedure Laterality Date    ABLATION, ENDOMETRIUM, THERMAL N/A 6/13/2018    Performed [facility] at [facility]    APPENDECTOMY      DILATION AND CURETTAGE OF UTERUS N/A 6/13/2018    Procedure: DILATION AND CURETTAGE, UTERUS;  Surgeon: Lesly Mendoza MD;  Location: [facility] OR;  Service: OB/GYN;  Laterality: N/A;    DILATION AND CURETTAGE, UTERUS N/A 6/13/2018    Performed by [facility] MD at [facility] OR    THERMAL ABLATION OF ENDOMETRIUM USING HYSTEROSCOPY N/A 6/13/2018    Procedure: ABLATION, ENDOMETRIUM, THERMAL;  Surgeon: Lesly Mendoza MD;  Location: [facility] OR;  Service: OB/GYN;  Laterality: N/A;    TUBAL LIGATION       Review of patient's allergies indicates:  No Known Allergies  Current Outpatient Medications on File Prior to Visit   Medication Sig Dispense Refill    topiramate (TOPAMAX) 50 MG tablet One daily for headache prevention 30 tablet 1    sumatriptan (IMITREX)  100 MG tablet Take one tablet at onset of headache. May repeat in 2 hours if needed. Max of 2 tabs in 24 hours 18 tablet 1    [DISCONTINUED] azithromycin (Z-ROHAN) 250 MG tablet 2 po day 1, then 1 po q day 6 tablet 0    [DISCONTINUED] dicyclomine (BENTYL) 20 mg tablet Take 1 tablet (20 mg total) by mouth 4 (four) times daily as needed (CRAMPS). 15 tablet 0    [DISCONTINUED] ondansetron (ZOFRAN-ODT) 4 MG TbDL Take 1 tablet (4 mg total) by mouth every 8 (eight) hours as needed (nausea). 20 tablet 0    [DISCONTINUED] pseudoephedrine-guaifenesin  mg (MUCINEX D)  mg per tablet Take 1 tablet by mouth 2 (two) times daily. 30 tablet 3     No current facility-administered medications on file prior to visit.      Social History     Socioeconomic History    Marital status: Single     Spouse name: Not on file    Number of children: Not on file    Years of education: Not on file    Highest education level: Not on file   Social Needs    Financial resource strain: Not on file    Food insecurity - worry: Not on file    Food insecurity - inability: Not on file    Transportation needs - medical: Not on file    Transportation needs - non-medical: Not on file   Occupational History    Not on file   Tobacco Use    Smoking status: Never Smoker    Smokeless tobacco: Never Used   Substance and Sexual Activity    Alcohol use: Yes     Alcohol/week: 0.0 oz     Comment: rare    Drug use: No    Sexual activity: Yes     Partners: Male     Birth control/protection: Surgical     Comment:    Other Topics Concern    Not on file   Social History Narrative    Not on file     Family History   Problem Relation Age of Onset    No Known Problems Mother     No Known Problems Father     Breast cancer Maternal Grandmother     Colon cancer Neg Hx     Ovarian cancer Neg Hx      The following portions of the patient's history were reviewed and updated as appropriate: allergies, current medications, past family  history, past medical history, past social history, past surgical history and problem list.      Review of Systems   All other systems reviewed and are negative.        Objective:    Vital Signs:  Vitals:    09/19/18 1157   BP: 118/68   Pulse: 82   Resp: 14       Physical Exam:  General:  alert,normal appearing female   Skin:  Skin color, texture, turgor normal. No rashes or lesions   Cardiovascular:  Pulmonary:  Abdomen: Regular rate and rhythm  Clear to auscultation bilaterally  soft, non-tender. Bowel sounds normal. No masses,  no organomegaly   Pelvis: External genitalia: normal general appearance  Urinary system: urethral meatus normal, bladder nontender  Vaginal: normal mucosa without prolapse or lesions  Cervix: normal appearance  Uterus: normal single, nontender  Adnexa: normal bimanual exam         Assessment:      1. Pelvic pain in female    2. Renal mass, left    3. Menorrhagia with irregular cycle          Plan:      Pelvic pain in female  -     Ambulatory consult to Urology    Renal mass, left  -     Ambulatory consult to Urology    Menorrhagia with irregular cycle      Counseled on further evaluation and management options. Pt desires to proceed with TLH/Bilateral salpingectomy (is considering whether she desires ovarian preservation or not).  Advised patient to proceed first with further evaluation of the left kidney mass.   If she is cleared regarding this as being potential cause of pain, may proceed with hysterectomy.  Counseled on risks, benefits, alternatives, and potential side effects.   Pt verbalized understanding.  Consents signed.  Will proceed with TLH/Bilateral salpingectomy pending acceptable preoperative testing. Case request placed.

## 2018-09-19 NOTE — PROGRESS NOTES
Subjective:    Patient ID: Nolvia Garcia is a 43 y.o. y.o. female.     Chief Complaint:   Chief Complaint   Patient presents with    Pelvic Pain     approx 2 wks, left sided       History of Present Illness   Nolvia presents today complaining of left lower abdominal/pelvic pain. Reports left lower abdominal pain that has been present for ~ 2 weeks.  She describes it as a cramping.  She started her menstrual cycle last week.  This was her first one since her ablation in June.  States bleeding was heavy but only lasted 2 days.  Pain persisted even after her cycle.  States she had a clear discharge that has been present since her ablation. Discharge is associated with odor. She was seen in ED 2 days ago and had CT scan which only revealed a 1.9 cm exophytic indeterminate mass involving the posterior left mid kidney.  She denies any urinary complaints.       Past Medical History:   Diagnosis Date    Abnormal Pap smear of cervix     Anxiety     GERD (gastroesophageal reflux disease)     Migraines      Past Surgical History:   Procedure Laterality Date    ABLATION, ENDOMETRIUM, THERMAL N/A 6/13/2018    Performed by [facility] at [facility] OR    APPENDECTOMY      DILATION AND CURETTAGE OF UTERUS N/A 6/13/2018    Procedure: DILATION AND CURETTAGE, UTERUS;  Surgeon: Lesly Mendoza MD;  Location: [facility] OR;  Service: OB/GYN;  Laterality: N/A;    DILATION AND CURETTAGE, UTERUS N/A 6/13/2018    Performed by [facility] at [facility]    THERMAL ABLATION OF ENDOMETRIUM USING HYSTEROSCOPY N/A 6/13/2018    Procedure: ABLATION, ENDOMETRIUM, THERMAL;  Surgeon: Lesly Mendoza MD;  Location: [facility] OR;  Service: OB/GYN;  Laterality: N/A;    TUBAL LIGATION       Review of patient's allergies indicates:  No Known Allergies  Current Outpatient Medications on File Prior to Visit   Medication Sig Dispense Refill    topiramate (TOPAMAX) 50 MG tablet One daily for headache prevention 30 tablet 1    sumatriptan (IMITREX)  100 MG tablet Take one tablet at onset of headache. May repeat in 2 hours if needed. Max of 2 tabs in 24 hours 18 tablet 1    [DISCONTINUED] azithromycin (Z-ROHAN) 250 MG tablet 2 po day 1, then 1 po q day 6 tablet 0    [DISCONTINUED] dicyclomine (BENTYL) 20 mg tablet Take 1 tablet (20 mg total) by mouth 4 (four) times daily as needed (CRAMPS). 15 tablet 0    [DISCONTINUED] ondansetron (ZOFRAN-ODT) 4 MG TbDL Take 1 tablet (4 mg total) by mouth every 8 (eight) hours as needed (nausea). 20 tablet 0    [DISCONTINUED] pseudoephedrine-guaifenesin  mg (MUCINEX D)  mg per tablet Take 1 tablet by mouth 2 (two) times daily. 30 tablet 3     No current facility-administered medications on file prior to visit.      Social History     Socioeconomic History    Marital status: Single     Spouse name: Not on file    Number of children: Not on file    Years of education: Not on file    Highest education level: Not on file   Social Needs    Financial resource strain: Not on file    Food insecurity - worry: Not on file    Food insecurity - inability: Not on file    Transportation needs - medical: Not on file    Transportation needs - non-medical: Not on file   Occupational History    Not on file   Tobacco Use    Smoking status: Never Smoker    Smokeless tobacco: Never Used   Substance and Sexual Activity    Alcohol use: Yes     Alcohol/week: 0.0 oz     Comment: rare    Drug use: No    Sexual activity: Yes     Partners: Male     Birth control/protection: Surgical     Comment:    Other Topics Concern    Not on file   Social History Narrative    Not on file     Family History   Problem Relation Age of Onset    No Known Problems Mother     No Known Problems Father     Breast cancer Maternal Grandmother     Colon cancer Neg Hx     Ovarian cancer Neg Hx      The following portions of the patient's history were reviewed and updated as appropriate: allergies, current medications, past family  history, past medical history, past social history, past surgical history and problem list.      Review of Systems   All other systems reviewed and are negative.        Objective:    Vital Signs:  Vitals:    09/19/18 1157   BP: 118/68   Pulse: 82   Resp: 14       Physical Exam:  General:  alert,normal appearing female   Skin:  Skin color, texture, turgor normal. No rashes or lesions   Cardiovascular:  Pulmonary:  Abdomen: Regular rate and rhythm  Clear to auscultation bilaterally  soft, non-tender. Bowel sounds normal. No masses,  no organomegaly   Pelvis: External genitalia: normal general appearance  Urinary system: urethral meatus normal, bladder nontender  Vaginal: normal mucosa without prolapse or lesions  Cervix: normal appearance  Uterus: normal single, nontender  Adnexa: normal bimanual exam         Assessment:      1. Pelvic pain in female    2. Renal mass, left    3. Menorrhagia with irregular cycle          Plan:      Pelvic pain in female  -     Ambulatory consult to Urology    Renal mass, left  -     Ambulatory consult to Urology    Menorrhagia with irregular cycle      Counseled on further evaluation and management options. Pt desires to proceed with TLH/Bilateral salpingectomy (is considering whether she desires ovarian preservation or not).  Advised patient to proceed first with further evaluation of the left kidney mass.   If she is cleared regarding this as being potential cause of pain, may proceed with hysterectomy.  Counseled on risks, benefits, alternatives, and potential side effects.   Pt verbalized understanding.  Consents signed.  Will proceed with TLH/Bilateral salpingectomy pending acceptable preoperative testing. Case request placed.

## 2018-09-20 ENCOUNTER — TELEPHONE (OUTPATIENT)
Dept: OBSTETRICS AND GYNECOLOGY | Facility: CLINIC | Age: 43
End: 2018-09-20

## 2018-09-20 LAB
CANDIDA RRNA VAG QL PROBE: NEGATIVE
G VAGINALIS RRNA GENITAL QL PROBE: POSITIVE
T VAGINALIS RRNA GENITAL QL PROBE: NEGATIVE

## 2018-09-20 NOTE — TELEPHONE ENCOUNTER
FAHEEM PARTIDAS w/ possible B/L [facility] scheduled for 10/23/18 per case request number [id number].  Preop and preadmit appt's scheduled and discussed.  Pt verbalized understanding.

## 2018-09-21 RX ORDER — METRONIDAZOLE 500 MG/1
500 TABLET ORAL EVERY 12 HOURS
Qty: 14 TABLET | Refills: 0 | Status: SHIPPED | OUTPATIENT
Start: 2018-09-21 | End: 2018-09-28

## 2018-10-03 ENCOUNTER — OFFICE VISIT (OUTPATIENT)
Dept: OBSTETRICS AND GYNECOLOGY | Facility: CLINIC | Age: 43
End: 2018-10-03
Payer: MEDICAID

## 2018-10-03 ENCOUNTER — PROCEDURE VISIT (OUTPATIENT)
Dept: OBSTETRICS AND GYNECOLOGY | Facility: CLINIC | Age: 43
End: 2018-10-03
Payer: MEDICAID

## 2018-10-03 VITALS
HEART RATE: 86 BPM | DIASTOLIC BLOOD PRESSURE: 78 MMHG | BODY MASS INDEX: 37.86 KG/M2 | WEIGHT: 187.81 LBS | HEIGHT: 59 IN | SYSTOLIC BLOOD PRESSURE: 110 MMHG | RESPIRATION RATE: 18 BRPM

## 2018-10-03 DIAGNOSIS — N83.202 BILATERAL OVARIAN CYSTS: ICD-10-CM

## 2018-10-03 DIAGNOSIS — N83.202 BILATERAL OVARIAN CYSTS: Primary | ICD-10-CM

## 2018-10-03 DIAGNOSIS — N83.201 BILATERAL OVARIAN CYSTS: Primary | ICD-10-CM

## 2018-10-03 DIAGNOSIS — N83.201 BILATERAL OVARIAN CYSTS: ICD-10-CM

## 2018-10-03 DIAGNOSIS — N92.1 MENORRHAGIA WITH IRREGULAR CYCLE: ICD-10-CM

## 2018-10-03 PROCEDURE — 76830 TRANSVAGINAL US NON-OB: CPT | Mod: PBBFAC | Performed by: OBSTETRICS & GYNECOLOGY

## 2018-10-03 PROCEDURE — 76830 TRANSVAGINAL US NON-OB: CPT | Mod: 26,S$PBB,, | Performed by: OBSTETRICS & GYNECOLOGY

## 2018-10-03 PROCEDURE — 99213 OFFICE O/P EST LOW 20 MIN: CPT | Mod: PBBFAC | Performed by: OBSTETRICS & GYNECOLOGY

## 2018-10-03 PROCEDURE — 99999 PR PBB SHADOW E&M-EST. PATIENT-LVL III: CPT | Mod: PBBFAC,,, | Performed by: OBSTETRICS & GYNECOLOGY

## 2018-10-03 PROCEDURE — 99213 OFFICE O/P EST LOW 20 MIN: CPT | Mod: S$PBB,25,, | Performed by: OBSTETRICS & GYNECOLOGY

## 2018-10-04 NOTE — PROGRESS NOTES
Subjective:   Patient ID: Nolvia Garcia is a 43 y.o. y.o. female.     Chief Complaint:   Chief Complaint   Patient presents with    Follow-up     Bilateral Ovarian Cyst          History of Present Illness:    Nolvia presents today to follow up bilateral ovarian cysts.  States she continues to have intermittent pain and has had increasing bleeding since her ablation.  Patient's last menstrual period was 09/11/2018.  She is scheduled for hysterectomy later this month.  Ultrasound performed earlier today revealed increase in size of the right ovarian cyst and decrease in size of the left but not complete resolution.  She is scheduled to see Urologist in a few weeks for kidney mass noted on CT.    The following portions of the patient's history were reviewed and updated as appropriate: allergies, current medications, past family history, past medical history, past social history, past surgical history and problem list.    Review of Systems   Genitourinary: Positive for menorrhagia, menstrual problem and pelvic pain.   All other systems reviewed and are negative.        Objective:   Vital Signs:  Vitals:    10/03/18 1259   BP: 110/78   Pulse: 86   Resp: 18       Physical Exam:  General:  alert; oriented; well-nourished female   Abdomen:  soft, mildly tender to deep palpation; no rebound or guarding.  Bowel sounds normal.   Pelvis: Deferred       Assessment:      1. Bilateral ovarian cysts    2.      Menorrhagia with irregular cycle      Plan:      Bilateral ovarian cysts    Menorrhagia with irregular cycle      Reviewed ultrasound findings with patient.   Further discussed hysterectomy.  Discussed cystectomy vs oophorectomy at time of hysterectomy. Pt considering.   Pain, torsion, bleeding precautions given.

## 2018-10-16 ENCOUNTER — ANESTHESIA EVENT (OUTPATIENT)
Dept: SURGERY | Facility: HOSPITAL | Age: 43
End: 2018-10-16
Payer: MEDICAID

## 2018-10-16 ENCOUNTER — HOSPITAL ENCOUNTER (OUTPATIENT)
Dept: PREADMISSION TESTING | Facility: HOSPITAL | Age: 43
Discharge: HOME | End: 2018-10-16
Attending: OBSTETRICS & GYNECOLOGY
Payer: MEDICAID

## 2018-10-16 ENCOUNTER — OFFICE VISIT (OUTPATIENT)
Dept: OBSTETRICS AND GYNECOLOGY | Facility: CLINIC | Age: 43
End: 2018-10-16
Payer: MEDICAID

## 2018-10-16 VITALS
RESPIRATION RATE: 18 BRPM | BODY MASS INDEX: 37.83 KG/M2 | HEART RATE: 80 BPM | DIASTOLIC BLOOD PRESSURE: 82 MMHG | SYSTOLIC BLOOD PRESSURE: 120 MMHG | HEIGHT: 59 IN | WEIGHT: 187.63 LBS

## 2018-10-16 VITALS — WEIGHT: 187.63 LBS | BODY MASS INDEX: 37.83 KG/M2 | HEIGHT: 59 IN

## 2018-10-16 DIAGNOSIS — N83.202 BILATERAL OVARIAN CYSTS: ICD-10-CM

## 2018-10-16 DIAGNOSIS — N92.1 MENORRHAGIA WITH IRREGULAR CYCLE: Primary | ICD-10-CM

## 2018-10-16 DIAGNOSIS — N83.201 BILATERAL OVARIAN CYSTS: ICD-10-CM

## 2018-10-16 DIAGNOSIS — R10.2 PELVIC PAIN IN FEMALE: ICD-10-CM

## 2018-10-16 PROCEDURE — 99999 PR PBB SHADOW E&M-EST. PATIENT-LVL III: CPT | Mod: PBBFAC,,, | Performed by: OBSTETRICS & GYNECOLOGY

## 2018-10-16 PROCEDURE — 99499 UNLISTED E&M SERVICE: CPT | Mod: S$PBB,,, | Performed by: OBSTETRICS & GYNECOLOGY

## 2018-10-16 PROCEDURE — 99213 OFFICE O/P EST LOW 20 MIN: CPT | Mod: PBBFAC,25 | Performed by: OBSTETRICS & GYNECOLOGY

## 2018-10-16 NOTE — H&P
Subjective:    Patient ID: Nolvia Gacria is a 43 y.o. y.o. female.     Chief Complaint:   Chief Complaint   Patient presents with    Pre-op Exam     [facility]; 10/23/18        History of Present Illness   Nolvia presents today for preoperative counseling prior to planned TLH/bilateral salpingectomy/right oophorectomy.   States she continues to have intermittent pain and has had increasing bleeding since her ablation in June.  Patient's last menstrual period was 09/11/2018. She has had a persistent right ovarian cyst that was last evaluated with ultrasound last week.  At that time, ultrasound revealed increase in size of the right ovarian cyst and decrease in size of the left but not complete resolution.  She desires to proceed with TLH/bilateral salpingectomy/right oophorectomy and desires preservation of left ovary if it is normal in appearance.  Kidney mass noted on CT that is to be evaluated by Urology tomorrow.      Past Medical History:   Diagnosis Date    Abnormal Pap smear of cervix     Anxiety     GERD (gastroesophageal reflux disease)     Migraines      Past Surgical History:   Procedure Laterality Date    ABLATION, ENDOMETRIUM, THERMAL N/A 6/13/2018    Per[facility] at [facility] OR    APPENDECTOMY      DILATION AND CURETTAGE OF UTERUS N/A 6/13/2018    Procedure: DILATION AND CURETTAGE, UTERUS;  Surgeon: Lesly Mendoza MD;  Location: [facility];  Service: OB/GYN;  Laterality: N/A;    DILATION AND CURETTAGE, UTERUS N/A 6/13/2018    [facility] at [facility] OR    THERMAL ABLATION OF ENDOMETRIUM USING HYSTEROSCOPY N/A 6/13/2018    Procedure: ABLATION, ENDOMETRIUM, THERMAL;  Surgeon: Lesly Mendoza MD;  Location: [facility];  Service: OB/GYN;  Laterality: N/A;    TUBAL LIGATION       Review of patient's allergies indicates:  No Known Allergies  Current Outpatient Medications on File Prior to Visit   Medication Sig Dispense Refill    sumatriptan (IMITREX) 100 MG tablet Take one  tablet at onset of headache. May repeat in 2 hours if needed. Max of 2 tabs in 24 hours 18 tablet 1    topiramate (TOPAMAX) 50 MG tablet One daily for headache prevention 30 tablet 1    HYDROcodone-acetaminophen (NORCO) 5-325 mg per tablet Take 1 tablet by mouth every 6 (six) hours as needed for Pain. 10 tablet 0     No current facility-administered medications on file prior to visit.      Social History     Socioeconomic History    Marital status: Single     Spouse name: Not on file    Number of children: Not on file    Years of education: Not on file    Highest education level: Not on file   Social Needs    Financial resource strain: Not on file    Food insecurity - worry: Not on file    Food insecurity - inability: Not on file    Transportation needs - medical: Not on file    Transportation needs - non-medical: Not on file   Occupational History    Not on file   Tobacco Use    Smoking status: Never Smoker    Smokeless tobacco: Never Used   Substance and Sexual Activity    Alcohol use: Yes     Alcohol/week: 0.0 oz     Comment: rare    Drug use: No    Sexual activity: Yes     Partners: Male     Birth control/protection: Surgical     Comment:    Other Topics Concern    Not on file   Social History Narrative    Not on file     Family History   Problem Relation Age of Onset    No Known Problems Mother     No Known Problems Father     Breast cancer Maternal Grandmother     Colon cancer Neg Hx     Ovarian cancer Neg Hx      The following portions of the patient's history were reviewed and updated as appropriate: allergies, current medications, past family history, past medical history, past social history, past surgical history and problem list.      Review of Systems   Genitourinary: Positive for menorrhagia, menstrual problem, pelvic pain and dysmenorrhea.   All other systems reviewed and are negative.        Objective:    Vital Signs:  Vitals:    10/16/18 0904   BP: 120/82   Pulse: 80    Resp: 18       Physical Exam:  General:  alert,normal appearing female   Skin:  Skin color, texture, turgor normal. No rashes or lesions   Cardiovascular:  Pulmonary:  Abdomen: Regular rate and rhythm  Clear to auscultation bilaterally  soft, non-tender. Bowel sounds normal. No masses,  no organomegaly   Pelvis: Deferred         Assessment:      1. Menorrhagia with irregular cycle    2. Pelvic pain in female    3. Bilateral ovarian cysts          Plan:      Menorrhagia with irregular cycle    Pelvic pain in female    Bilateral ovarian cysts      Counseled on further evaluation and management options.   Pt desires to proceed with TLH/bilateral salpingectomy/right oophorectomy.  Plan to leave normal appearing left ovary.  Counseled on risks, benefits, alternatives, and potential side effects.   Pt verbalized understanding.  Consents signed.  Will proceed with TLH/bilateral salpingectomy/right oophorectomy pending acceptable preoperative testing.

## 2018-10-16 NOTE — ANESTHESIA PREPROCEDURE EVALUATION
10/16/2018  Nolvia Garcia is a 43 y.o., female.    Pre-op Assessment    I have reviewed the Patient Summary Reports.     I have reviewed the Nursing Notes.   I have reviewed the Medications.     Review of Systems  Anesthesia Hx:  No problems with previous Anesthesia  History of prior surgery of interest to airway management or planning:  Denies Personal Hx of Anesthesia complications.   Social:  Non-Smoker, No Alcohol Use    Hematology/Oncology:     Oncology Normal    -- Anemia:   EENT/Dental:EENT/Dental Normal   Cardiovascular:  Cardiovascular Normal Exercise tolerance: good     Pulmonary:  Pulmonary Normal    Renal/:  Renal/ Normal     Hepatic/GI:   GERD, well controlled    Musculoskeletal:  Musculoskeletal Normal    Neurological:   Headaches    Endocrine:  Endocrine Normal    Dermatological:  Skin Normal    Psych:  Psychiatric Normal           Physical Exam  General:  Well nourished, Obesity    Airway/Jaw/Neck:  Airway Findings: Mouth Opening: Normal Tongue: Normal  General Airway Assessment: Adult  Mallampati: III  TM Distance: Normal, at least 6 cm  Jaw/Neck Findings:     Neck ROM: Normal ROM      Dental:  Dental Findings: In tact        Mental Status:  Mental Status Findings:  Cooperative, Alert and Oriented         Anesthesia Plan  Type of Anesthesia, risks & benefits discussed:  Anesthesia Type:  general  Patient's Preference:   Intra-op Monitoring Plan:   Intra-op Monitoring Plan Comments:   Post Op Pain Control Plan: multimodal analgesia  Post Op Pain Control Plan Comments:   Induction:   IV  Beta Blocker:  Patient is not currently on a Beta-Blocker (No further documentation required).       Informed Consent: Patient understands risks and agrees with Anesthesia plan.  Questions answered. Anesthesia consent signed with patient.  ASA Score: 2     Day of Surgery Review of History & Physical:  I have interviewed and examined the patient. I have reviewed the patient's H&P dated: 10/23/18. There are no significant changes.  H&P update referred to the surgeon.         Ready For Surgery From Anesthesia Perspective.

## 2018-10-16 NOTE — PROGRESS NOTES
Subjective:    Patient ID: Nolvia Garcia is a 43 y.o. y.o. female.     Chief Complaint:   Chief Complaint   Patient presents with    Pre-op Exam     [facility]; 10/23/18        History of Present Illness   Nolvia presents today for preoperative counseling prior to planned TLH/bilateral salpingectomy/right oophorectomy.   States she continues to have intermittent pain and has had increasing bleeding since her ablation in June.  Patient's last menstrual period was 09/11/2018. She has had a persistent right ovarian cyst that was last evaluated with ultrasound last week.  At that time, ultrasound revealed increase in size of the right ovarian cyst and decrease in size of the left but not complete resolution.  She desires to proceed with TLH/bilateral salpingectomy/right oophorectomy and desires preservation of left ovary if it is normal in appearance.  Kidney mass noted on CT that is to be evaluated by Urology tomorrow.      Past Medical History:   Diagnosis Date    Abnormal Pap smear of cervix     Anxiety     GERD (gastroesophageal reflux disease)     Migraines      Past Surgical History:   Procedure Laterality Date    ABLATION, ENDOMETRIUM, THERMAL N/A 6/13/2018    Performed by [facility] at [facility] OR    APPENDECTOMY      DILATION AND CURETTAGE OF UTERUS N/A 6/13/2018    Procedure: DILATION AND CURETTAGE, UTERUS;  Surgeon: Lesly Mendoza MD;  Location: [facility];  Service: OB/GYN;  Laterality: N/A;    DILATION AND CURETTAGE, UTERUS N/A 6/13/2018    Performed by [facility] at [facility] OR    THERMAL ABLATION OF ENDOMETRIUM USING HYSTEROSCOPY N/A 6/13/2018    Procedure: ABLATION, ENDOMETRIUM, THERMAL;  Surgeon: Lesly Mendoza MD;  Location: [facility];  Service: OB/GYN;  Laterality: N/A;    TUBAL LIGATION       Review of patient's allergies indicates:  No Known Allergies  Current Outpatient Medications on File Prior to Visit   Medication Sig Dispense Refill    sumatriptan (IMITREX) 100 MG tablet Take one  tablet at onset of headache. May repeat in 2 hours if needed. Max of 2 tabs in 24 hours 18 tablet 1    topiramate (TOPAMAX) 50 MG tablet One daily for headache prevention 30 tablet 1    HYDROcodone-acetaminophen (NORCO) 5-325 mg per tablet Take 1 tablet by mouth every 6 (six) hours as needed for Pain. 10 tablet 0     No current facility-administered medications on file prior to visit.      Social History     Socioeconomic History    Marital status: Single     Spouse name: Not on file    Number of children: Not on file    Years of education: Not on file    Highest education level: Not on file   Social Needs    Financial resource strain: Not on file    Food insecurity - worry: Not on file    Food insecurity - inability: Not on file    Transportation needs - medical: Not on file    Transportation needs - non-medical: Not on file   Occupational History    Not on file   Tobacco Use    Smoking status: Never Smoker    Smokeless tobacco: Never Used   Substance and Sexual Activity    Alcohol use: Yes     Alcohol/week: 0.0 oz     Comment: rare    Drug use: No    Sexual activity: Yes     Partners: Male     Birth control/protection: Surgical     Comment:    Other Topics Concern    Not on file   Social History Narrative    Not on file     Family History   Problem Relation Age of Onset    No Known Problems Mother     No Known Problems Father     Breast cancer Maternal Grandmother     Colon cancer Neg Hx     Ovarian cancer Neg Hx      The following portions of the patient's history were reviewed and updated as appropriate: allergies, current medications, past family history, past medical history, past social history, past surgical history and problem list.      Review of Systems   Genitourinary: Positive for menorrhagia, menstrual problem, pelvic pain and dysmenorrhea.   All other systems reviewed and are negative.        Objective:    Vital Signs:  Vitals:    10/16/18 0904   BP: 120/82   Pulse: 80    Resp: 18       Physical Exam:  General:  alert,normal appearing female   Skin:  Skin color, texture, turgor normal. No rashes or lesions   Cardiovascular:  Pulmonary:  Abdomen: Regular rate and rhythm  Clear to auscultation bilaterally  soft, non-tender. Bowel sounds normal. No masses,  no organomegaly   Pelvis: Deferred         Assessment:      1. Menorrhagia with irregular cycle    2. Pelvic pain in female    3. Bilateral ovarian cysts          Plan:      Menorrhagia with irregular cycle    Pelvic pain in female    Bilateral ovarian cysts      Counseled on further evaluation and management options.   Pt desires to proceed with TLH/bilateral salpingectomy/right oophorectomy.  Plan to leave normal appearing left ovary.  Counseled on risks, benefits, alternatives, and potential side effects.   Pt verbalized understanding.  Consents signed.  Will proceed with TLH/bilateral salpingectomy/right oophorectomy pending acceptable preoperative testing.

## 2018-10-16 NOTE — DISCHARGE INSTRUCTIONS
[facility]  Pre Admit Instructions    Day and Date of Procedure:  Tuesday 10-23-18  Arrival time: 130pm      · Call your doctor if you become ill before your surgery  · Someone will call you between 1 p.m. And 5 p.m.the workday before the procedure to give you an arrival time       - Before 7 a.m. Enter through Emergency Room       - 7 a.m. To 5 p.m. Enter through Patient Registration Main Lobby  · You must have a responsible  to bring you home    Do NOT eat or drink anything   past midnight before your procedure day    Please    · Do not wear makeup, jewelry, nail polish or body piercings  · Bring containers/solution for contacts, dentures, bridges - these and hearing aids will be removed before your procedure  · Do not bring cash, jewelry or valuables the day of your procedure   · No smoking at least 24 hours before your procedure  · Wear clothing that is comfortable and easy to take off and put on  · Do NOT shave for at least 5 days before your surgery    Review skin preparation handout before using. Shower with Hibiclens the Night before the procedure. Bring remaining Hibiclens with you the morning of surgery.                Information about your stay (Please Review)    Before Surgery  1. Cafeteria Meals: 7am to 10am; 11am to 1:30 pm; Dinner/Supper must may be ordered between 11:00 am and 4 pm from the [facility] After [employer] Menu. Food will be available to  between 5 pm and 6 pm. The kitchen phone extension is [phone].  2. Your doctor may order and review labs, x-rays, ECG or other tests as a pre-surgery workup and will call you if there is need for follow up.  3. No smoking inside or outside the hospital on hospital grounds.  4. Wear clothing that is easy to take off and put on.  The hospital will provide you with a gown.  5. You may bring robe, slippers, nightwear, and toiletries (toothbrush, toothpaste, makeup).  6. If your doctor orders a Fleets Enema or other prep, follow package  and/or doctors orders.  7. Brush your teeth and rinse your mouth the morning of surgery, but dont swallow the water.  8. The nurse will ask questions and check your condition.  The doctor may gabby your surgical site.  9. Compression boots may be put on your calves to reduce the risk of blood clots.  10. The doctor may order medicine to help you relax before surgery.  After Surgery  1. The nurse will check your temperature, breathing, blood pressure, heart rate, IV site, and surgery site.  2. A diet will be ordered-most start with ice chips and then advance slowly to other foods.  3. If you have IV fluids the IV pump will beep to let the nurse know that she needs to check it.  4. You may have a urinary catheter and staff may measure your oral intake and urine output.  5. Pain medication may be ordered by the doctor after surgery.  If you have a pain management device tell your caretakers not to press the button because of OVERDOSE RISK.  6. When the nurse or doctor tells you it is okay to get out of bed, ask for help until you are stable.  7. The nurse may ask you to turn, cough, and deep breathe to prevent lung problems.  You can use a pillow to hold your incision when you deep breathe or cough to reduce pain.  8. The nurse will give you discharge instructions--incision care, symptoms to report to your doctor, and your follow-up appointment when you are discharged.  You cannot drive yourself home.  Goal for Discharge from One Day Surgery  · Control pain with an oral medication  · Walk without feeling dizzy or weak  · Tolerate liquids well  · Urinate without difficulty    Things you can do to  Reduce the Risk of Infections or Complications  Wash Hands and use Waterless Hand Sanitizers  · Wash hands frequently with soap and warm water for at least 15 seconds.   · Use hand sanitizers (alcohol based) often at home and in public if hands are not visibly soiled  Take Antibiotic Exactly as Prescribed  · Do not stop  antibiotics too soon; you risk developing infection resistant to antibiotics  · Take your antibiotic even if you are feeling better and even if they upset your stomach  · Call the doctor if you cant tolerate the antibiotic or you have an allergic reaction  Stay Healthy  · Take medicines as prescribed by your doctor  · Keep your diabetes under control - diet and medication  · Get enough rest, exercise and eat a healthy diet  Keep the Wound Clean and Dry  · Wash hands before and after taking care of the incision (cut)  · Wash hands when you remove a dressing, before you touch/apply a new dressing  · Shower and clean incision with antibacterial soap and rinse well if the doctor approves  · Allow the cut to dry completely before putting on a clean dressing  · Do not touch the part of the bandage that will cover the incision  · Do not use ointments unless your doctor tells you to-can promote bacterial growth  · If ordered, put ointment directly on the dressing-do not touch the end of the tube  · Do not scrub, remove scabs, or leave a damp dressing on the incision  · Do not use peroxide or alcohol to clean the incision unless the doctor tells you to   · Do not let children, pets or anyone else contaminate the incision  Stop Smoking To Prevent Infection  · Stop smoking-Centers for Disease Control recommends 30 days before surgery  · Smokers get more infections after surgery-studies have shown 6 times the risk  · Smokers have more scarring and heal slower-open wounds get infected easier  Prevent Respiratory complications  · Stop smoking  · Turn, cough, and deep breathe even if you have some pain when you do so.  · Splint your incision with a pillow when you cough/deep breath, to help control pain.  · Do not lie in one position for long periods of time.   Prevent Blood Clots  · When you wake move your legs, flex your feet, rotate your ankles, wiggle your toes  · Get up when the doctor says its ok.  Dangle your feet from  the side of the bed  · Report symptoms-leg pain, redness/swelling, warm to touch; fever; shortness of breath, chest pain, severe upper back pain.

## 2018-10-16 NOTE — H&P (VIEW-ONLY)
Subjective:    Patient ID: Nolvia Garcia is a 43 y.o. y.o. female.     Chief Complaint:   Chief Complaint   Patient presents with    Pre-op Exam     [facility]; 10/23/18        History of Present Illness   Nolvia presents today for preoperative counseling prior to planned TLH/bilateral salpingectomy/right oophorectomy.   States she continues to have intermittent pain and has had increasing bleeding since her ablation in June.  Patient's last menstrual period was 09/11/2018. She has had a persistent right ovarian cyst that was last evaluated with ultrasound last week.  At that time, ultrasound revealed increase in size of the right ovarian cyst and decrease in size of the left but not complete resolution.  She desires to proceed with TLH/bilateral salpingectomy/right oophorectomy and desires preservation of left ovary if it is normal in appearance.  Kidney mass noted on CT that is to be evaluated by Urology tomorrow.      Past Medical History:   Diagnosis Date    Abnormal Pap smear of cervix     Anxiety     GERD (gastroesophageal reflux disease)     Migraines      Past Surgical History:   Procedure Laterality Date    ABLATION, ENDOMETRIUM, THERMAL N/A 6/13/2018    Performed by [facility] at [facility] OR    APPENDECTOMY      DILATION AND CURETTAGE OF UTERUS N/A 6/13/2018    Procedure: DILATION AND CURETTAGE, UTERUS;  Surgeon: Lesly Mendoza MD;  Location: [facility];  Service: OB/GYN;  Laterality: N/A;    DILATION AND CURETTAGE, UTERUS N/A 6/13/2018    Performed by [facility] at [facility]    THERMAL ABLATION OF ENDOMETRIUM USING HYSTEROSCOPY N/A 6/13/2018    Procedure: ABLATION, ENDOMETRIUM, THERMAL;  Surgeon: Lesly Mendoza MD;  Location: [facility];  Service: OB/GYN;  Laterality: N/A;    TUBAL LIGATION       Review of patient's allergies indicates:  No Known Allergies  Current Outpatient Medications on File Prior to Visit   Medication Sig Dispense Refill    sumatriptan (IMITREX) 100 MG tablet Take one  tablet at onset of headache. May repeat in 2 hours if needed. Max of 2 tabs in 24 hours 18 tablet 1    topiramate (TOPAMAX) 50 MG tablet One daily for headache prevention 30 tablet 1    HYDROcodone-acetaminophen (NORCO) 5-325 mg per tablet Take 1 tablet by mouth every 6 (six) hours as needed for Pain. 10 tablet 0     No current facility-administered medications on file prior to visit.      Social History     Socioeconomic History    Marital status: Single     Spouse name: Not on file    Number of children: Not on file    Years of education: Not on file    Highest education level: Not on file   Social Needs    Financial resource strain: Not on file    Food insecurity - worry: Not on file    Food insecurity - inability: Not on file    Transportation needs - medical: Not on file    Transportation needs - non-medical: Not on file   Occupational History    Not on file   Tobacco Use    Smoking status: Never Smoker    Smokeless tobacco: Never Used   Substance and Sexual Activity    Alcohol use: Yes     Alcohol/week: 0.0 oz     Comment: rare    Drug use: No    Sexual activity: Yes     Partners: Male     Birth control/protection: Surgical     Comment:    Other Topics Concern    Not on file   Social History Narrative    Not on file     Family History   Problem Relation Age of Onset    No Known Problems Mother     No Known Problems Father     Breast cancer Maternal Grandmother     Colon cancer Neg Hx     Ovarian cancer Neg Hx      The following portions of the patient's history were reviewed and updated as appropriate: allergies, current medications, past family history, past medical history, past social history, past surgical history and problem list.      Review of Systems   Genitourinary: Positive for menorrhagia, menstrual problem, pelvic pain and dysmenorrhea.   All other systems reviewed and are negative.        Objective:    Vital Signs:  Vitals:    10/16/18 0904   BP: 120/82   Pulse: 80    Resp: 18       Physical Exam:  General:  alert,normal appearing female   Skin:  Skin color, texture, turgor normal. No rashes or lesions   Cardiovascular:  Pulmonary:  Abdomen: Regular rate and rhythm  Clear to auscultation bilaterally  soft, non-tender. Bowel sounds normal. No masses,  no organomegaly   Pelvis: Deferred         Assessment:      1. Menorrhagia with irregular cycle    2. Pelvic pain in female    3. Bilateral ovarian cysts          Plan:      Menorrhagia with irregular cycle    Pelvic pain in female    Bilateral ovarian cysts      Counseled on further evaluation and management options.   Pt desires to proceed with TLH/bilateral salpingectomy/right oophorectomy.  Plan to leave normal appearing left ovary.  Counseled on risks, benefits, alternatives, and potential side effects.   Pt verbalized understanding.  Consents signed.  Will proceed with TLH/bilateral salpingectomy/right oophorectomy pending acceptable preoperative testing.

## 2018-10-17 ENCOUNTER — OFFICE VISIT (OUTPATIENT)
Dept: UROLOGY | Facility: CLINIC | Age: 43
End: 2018-10-17
Attending: UROLOGY
Payer: MEDICAID

## 2018-10-17 VITALS
WEIGHT: 187.19 LBS | BODY MASS INDEX: 37.74 KG/M2 | RESPIRATION RATE: 18 BRPM | HEIGHT: 59 IN | DIASTOLIC BLOOD PRESSURE: 73 MMHG | SYSTOLIC BLOOD PRESSURE: 105 MMHG | HEART RATE: 76 BPM

## 2018-10-17 DIAGNOSIS — N28.89 RENAL MASS: Primary | ICD-10-CM

## 2018-10-17 PROCEDURE — 99204 OFFICE O/P NEW MOD 45 MIN: CPT | Mod: S$PBB,,, | Performed by: UROLOGY

## 2018-10-17 PROCEDURE — 99213 OFFICE O/P EST LOW 20 MIN: CPT | Mod: PBBFAC | Performed by: UROLOGY

## 2018-10-17 PROCEDURE — 99999 PR PBB SHADOW E&M-EST. PATIENT-LVL III: CPT | Mod: PBBFAC,,, | Performed by: UROLOGY

## 2018-10-17 NOTE — LETTER
October 17, 2018      Lesly Mendoza MD  [id number] [place] [address] Spec. - Urology  [address] [facility] [address]  Phone: [phone]          Patient: Nolvia Garcia   MR Number: [id number]   YOB: 1975   Date of Visit: 10/17/2018       Dear Dr. Lesly Mendoza:    Thank you for referring Nolvia Garcia to me for evaluation. Attached you will find relevant portions of my assessment and plan of care.    If you have questions, please do not hesitate to call me. I look forward to following Nolvia Garcia along with you.    Sincerely,    Fredy Sims Jr., MD    Enclosure  CC:  No Recipients    If you would like to receive this communication electronically, please contact [email or web address] or [phone] to request more information on [employer] Link access.    For providers and/or their staff who would like to refer a patient to [facility], please contact us through our one-stop-shop provider referral line, [facility], at [phone].    If you feel you have received this communication in error or would no longer like to receive these types of communications, please e-mail [email or web address]

## 2018-10-17 NOTE — PROGRESS NOTES
Subjective:       Patient ID: Nolvia Garcia is a 43 y.o. female.    Chief Complaint: Cyst (right-per pt.)    HPI patient is here for an indeterminate 1.9 cm left mid pole exophytic renal mass based on CT scan without contrast.  She needs a CT with contrast.    Past Medical History:   Diagnosis Date    Abnormal Pap smear of cervix     Anxiety     GERD (gastroesophageal reflux disease)     Migraines        Past Surgical History:   Procedure Laterality Date    ABLATION, ENDOMETRIUM, THERMAL N/A 6/13/2018    Performed by Lesly Mendoza MD at [facility] OR    APPENDECTOMY      DILATION AND CURETTAGE OF UTERUS N/A 6/13/2018    Procedure: DILATION AND CURETTAGE, UTERUS;  Surgeon: Lesly Mendoza MD;  Location: [facility] OR;  Service: OB/GYN;  Laterality: N/A;    DILATION AND CURETTAGE, UTERUS N/A 6/13/2018    Performed by Lesly Mendoza MD at [facility] OR    THERMAL ABLATION OF ENDOMETRIUM USING HYSTEROSCOPY N/A 6/13/2018    Procedure: ABLATION, ENDOMETRIUM, THERMAL;  Surgeon: Lesly Mendoza MD;  Location: [facility] OR;  Service: OB/GYN;  Laterality: N/A;    TUBAL LIGATION         Family History   Problem Relation Age of Onset    No Known Problems Mother     No Known Problems Father     Breast cancer Maternal Grandmother     Colon cancer Neg Hx     Ovarian cancer Neg Hx        Social History     Socioeconomic History    Marital status: Single     Spouse name: Not on file    Number of children: Not on file    Years of education: Not on file    Highest education level: Not on file   Social Needs    Financial resource strain: Not on file    Food insecurity - worry: Not on file    Food insecurity - inability: Not on file    Transportation needs - medical: Not on file    Transportation needs - non-medical: Not on file   Occupational History    Not on file   Tobacco Use    Smoking status: Never Smoker    Smokeless tobacco: Never Used   Substance and Sexual Activity    Alcohol use: Yes     Alcohol/week: 0.0 oz      Comment: rare    Drug use: No    Sexual activity: Yes     Partners: Male     Birth control/protection: Surgical     Comment:    Other Topics Concern    Not on file   Social History Narrative    Not on file       Allergies:  Patient has no known allergies.    Medications:    Current Outpatient Medications:     HYDROcodone-acetaminophen (NORCO) 5-325 mg per tablet, Take 1 tablet by mouth every 6 (six) hours as needed for Pain., Disp: 10 tablet, Rfl: 0    sumatriptan (IMITREX) 100 MG tablet, Take one tablet at onset of headache. May repeat in 2 hours if needed. Max of 2 tabs in 24 hours, Disp: 18 tablet, Rfl: 1    topiramate (TOPAMAX) 50 MG tablet, One daily for headache prevention (Patient taking differently: Take 50 mg by mouth once daily. One daily for headache prevention), Disp: 30 tablet, Rfl: 1    Review of Systems   Constitutional: Negative.    HENT: Negative.    Eyes: Negative.    Respiratory: Negative.    Endocrine: Negative.    Genitourinary: Negative.    Musculoskeletal: Negative.    Allergic/Immunologic: Negative.    Neurological: Negative.    Hematological: Negative.    Psychiatric/Behavioral: Negative.        Objective:      Physical Exam   Constitutional: She appears well-developed.   HENT:   Head: Normocephalic.   Cardiovascular: Normal rate.    Pulmonary/Chest: Effort normal.   Abdominal: Soft.   Musculoskeletal: Normal range of motion.   Neurological: She is alert.   Skin: Skin is warm.         Assessment:       1. Renal mass        Plan:       Nolvia was seen today for cyst.    Diagnoses and all orders for this visit:    Renal mass  -     CT Abdomen Pelvis W Wo Contrast; Future  -     US Retroperitoneal Complete (Kidney and; Future        await results of CT scan and us    OK to have hysterectomy and hopefully we can get the results of her ultrasound and CT scan TURP prior to hysterectomy

## 2018-10-22 ENCOUNTER — TELEPHONE (OUTPATIENT)
Dept: UROLOGY | Facility: CLINIC | Age: 43
End: 2018-10-22

## 2018-10-22 ENCOUNTER — HOSPITAL ENCOUNTER (OUTPATIENT)
Dept: RADIOLOGY | Facility: HOSPITAL | Age: 43
Discharge: HOME | End: 2018-10-22
Attending: UROLOGY
Payer: MEDICAID

## 2018-10-22 DIAGNOSIS — N28.89 RENAL MASS: ICD-10-CM

## 2018-10-22 PROCEDURE — 74178 CT ABD&PLV WO CNTR FLWD CNTR: CPT | Mod: 26,,, | Performed by: RADIOLOGY

## 2018-10-22 PROCEDURE — 76770 US EXAM ABDO BACK WALL COMP: CPT | Mod: TC

## 2018-10-22 PROCEDURE — 76770 US EXAM ABDO BACK WALL COMP: CPT | Mod: 26,,, | Performed by: RADIOLOGY

## 2018-10-22 PROCEDURE — 74178 CT ABD&PLV WO CNTR FLWD CNTR: CPT | Mod: TC

## 2018-10-22 PROCEDURE — 25500020 PHARM REV CODE 255: Performed by: UROLOGY

## 2018-10-22 RX ADMIN — IOHEXOL 100 ML: 350 INJECTION, SOLUTION INTRAVENOUS at 02:10

## 2018-10-23 ENCOUNTER — ANESTHESIA (OUTPATIENT)
Dept: SURGERY | Facility: HOSPITAL | Age: 43
End: 2018-10-23
Payer: MEDICAID

## 2018-10-23 ENCOUNTER — HOSPITAL ENCOUNTER (OUTPATIENT)
Facility: HOSPITAL | Age: 43
Discharge: HOME | End: 2018-10-24
Attending: OBSTETRICS & GYNECOLOGY | Admitting: OBSTETRICS & GYNECOLOGY
Payer: MEDICAID

## 2018-10-23 DIAGNOSIS — N92.1 MENORRHAGIA WITH IRREGULAR CYCLE: Primary | ICD-10-CM

## 2018-10-23 DIAGNOSIS — N93.8 DYSFUNCTIONAL UTERINE BLEEDING: ICD-10-CM

## 2018-10-23 DIAGNOSIS — R10.2 PELVIC PAIN IN FEMALE: ICD-10-CM

## 2018-10-23 LAB — POCT GLUCOSE: 79 MG/DL (ref 70–110)

## 2018-10-23 PROCEDURE — 00840 ANES IPER PX LOWER ABD NOS: CPT | Mod: QZ | Performed by: NURSE ANESTHETIST, CERTIFIED REGISTERED

## 2018-10-23 PROCEDURE — 37000009 HC ANESTHESIA EA ADD 15 MINS: Performed by: OBSTETRICS & GYNECOLOGY

## 2018-10-23 PROCEDURE — 00840 ANES IPER PX LOWER ABD NOS: CPT | Performed by: OBSTETRICS & GYNECOLOGY

## 2018-10-23 PROCEDURE — 94760 N-INVAS EAR/PLS OXIMETRY 1: CPT

## 2018-10-23 PROCEDURE — 36000710: Performed by: OBSTETRICS & GYNECOLOGY

## 2018-10-23 PROCEDURE — 63600175 PHARM REV CODE 636 W HCPCS: Performed by: OBSTETRICS & GYNECOLOGY

## 2018-10-23 PROCEDURE — 71000033 HC RECOVERY, INTIAL HOUR: Performed by: OBSTETRICS & GYNECOLOGY

## 2018-10-23 PROCEDURE — 94799 UNLISTED PULMONARY SVC/PX: CPT

## 2018-10-23 PROCEDURE — 88307 TISSUE EXAM BY PATHOLOGIST: CPT | Mod: 26,,, | Performed by: PATHOLOGY

## 2018-10-23 PROCEDURE — 88307 TISSUE EXAM BY PATHOLOGIST: CPT | Performed by: PATHOLOGY

## 2018-10-23 PROCEDURE — 36000711: Performed by: OBSTETRICS & GYNECOLOGY

## 2018-10-23 PROCEDURE — 25000003 PHARM REV CODE 250: Performed by: OBSTETRICS & GYNECOLOGY

## 2018-10-23 PROCEDURE — 58571 TLH W/T/O 250 G OR LESS: CPT | Mod: ,,, | Performed by: OBSTETRICS & GYNECOLOGY

## 2018-10-23 PROCEDURE — 25000003 PHARM REV CODE 250: Performed by: NURSE ANESTHETIST, CERTIFIED REGISTERED

## 2018-10-23 PROCEDURE — 37000008 HC ANESTHESIA 1ST 15 MINUTES: Performed by: OBSTETRICS & GYNECOLOGY

## 2018-10-23 PROCEDURE — 63600175 PHARM REV CODE 636 W HCPCS: Performed by: NURSE ANESTHETIST, CERTIFIED REGISTERED

## 2018-10-23 PROCEDURE — 27201423 OPTIME MED/SURG SUP & DEVICES STERILE SUPPLY: Performed by: OBSTETRICS & GYNECOLOGY

## 2018-10-23 PROCEDURE — 58571 TLH W/T/O 250 G OR LESS: CPT | Mod: 80,,, | Performed by: OBSTETRICS & GYNECOLOGY

## 2018-10-23 RX ORDER — ONDANSETRON HYDROCHLORIDE 2 MG/ML
INJECTION, SOLUTION INTRAMUSCULAR; INTRAVENOUS
Status: DISCONTINUED | OUTPATIENT
Start: 2018-10-23 | End: 2018-10-23

## 2018-10-23 RX ORDER — LIDOCAINE HYDROCHLORIDE 20 MG/ML
INJECTION, SOLUTION EPIDURAL; INFILTRATION; INTRACAUDAL; PERINEURAL
Status: DISCONTINUED | OUTPATIENT
Start: 2018-10-23 | End: 2018-10-23

## 2018-10-23 RX ORDER — GLYCOPYRROLATE 0.2 MG/ML
INJECTION INTRAMUSCULAR; INTRAVENOUS
Status: DISCONTINUED | OUTPATIENT
Start: 2018-10-23 | End: 2018-10-23

## 2018-10-23 RX ORDER — MIDAZOLAM HYDROCHLORIDE 1 MG/ML
INJECTION INTRAMUSCULAR; INTRAVENOUS
Status: DISCONTINUED | OUTPATIENT
Start: 2018-10-23 | End: 2018-10-23

## 2018-10-23 RX ORDER — HYDROCODONE BITARTRATE AND ACETAMINOPHEN 10; 325 MG/1; MG/1
1 TABLET ORAL EVERY 4 HOURS PRN
Status: DISCONTINUED | OUTPATIENT
Start: 2018-10-23 | End: 2018-10-24 | Stop reason: HOSPADM

## 2018-10-23 RX ORDER — KETOROLAC TROMETHAMINE 30 MG/ML
30 INJECTION, SOLUTION INTRAMUSCULAR; INTRAVENOUS EVERY 6 HOURS
Status: DISCONTINUED | OUTPATIENT
Start: 2018-10-23 | End: 2018-10-24 | Stop reason: HOSPADM

## 2018-10-23 RX ORDER — ONDANSETRON 2 MG/ML
4 INJECTION INTRAMUSCULAR; INTRAVENOUS EVERY 6 HOURS PRN
Status: DISCONTINUED | OUTPATIENT
Start: 2018-10-23 | End: 2018-10-24 | Stop reason: HOSPADM

## 2018-10-23 RX ORDER — CEFAZOLIN SODIUM 2 G/50ML
2 SOLUTION INTRAVENOUS
Status: COMPLETED | OUTPATIENT
Start: 2018-10-23 | End: 2018-10-23

## 2018-10-23 RX ORDER — NEOSTIGMINE METHYLSULFATE 5 MG/5 ML
SYRINGE (ML) INTRAVENOUS
Status: DISCONTINUED | OUTPATIENT
Start: 2018-10-23 | End: 2018-10-23

## 2018-10-23 RX ORDER — SIMETHICONE 80 MG
80 TABLET,CHEWABLE ORAL EVERY 4 HOURS PRN
Status: DISCONTINUED | OUTPATIENT
Start: 2018-10-23 | End: 2018-10-24 | Stop reason: HOSPADM

## 2018-10-23 RX ORDER — HYDROCODONE BITARTRATE AND ACETAMINOPHEN 5; 325 MG/1; MG/1
1 TABLET ORAL EVERY 4 HOURS PRN
Status: DISCONTINUED | OUTPATIENT
Start: 2018-10-23 | End: 2018-10-24 | Stop reason: HOSPADM

## 2018-10-23 RX ORDER — SODIUM CHLORIDE, SODIUM LACTATE, POTASSIUM CHLORIDE, CALCIUM CHLORIDE 600; 310; 30; 20 MG/100ML; MG/100ML; MG/100ML; MG/100ML
INJECTION, SOLUTION INTRAVENOUS CONTINUOUS PRN
Status: DISCONTINUED | OUTPATIENT
Start: 2018-10-23 | End: 2018-10-23

## 2018-10-23 RX ORDER — FENTANYL CITRATE 50 UG/ML
INJECTION, SOLUTION INTRAMUSCULAR; INTRAVENOUS
Status: DISCONTINUED | OUTPATIENT
Start: 2018-10-23 | End: 2018-10-23

## 2018-10-23 RX ORDER — DEXAMETHASONE SODIUM PHOSPHATE 4 MG/ML
INJECTION, SOLUTION INTRA-ARTICULAR; INTRALESIONAL; INTRAMUSCULAR; INTRAVENOUS; SOFT TISSUE
Status: DISCONTINUED | OUTPATIENT
Start: 2018-10-23 | End: 2018-10-23

## 2018-10-23 RX ORDER — KETOROLAC TROMETHAMINE 30 MG/ML
INJECTION, SOLUTION INTRAMUSCULAR; INTRAVENOUS
Status: DISCONTINUED | OUTPATIENT
Start: 2018-10-23 | End: 2018-10-23

## 2018-10-23 RX ORDER — PROPOFOL 10 MG/ML
VIAL (ML) INTRAVENOUS
Status: DISCONTINUED | OUTPATIENT
Start: 2018-10-23 | End: 2018-10-23

## 2018-10-23 RX ORDER — HYDROMORPHONE HYDROCHLORIDE 2 MG/ML
1 INJECTION, SOLUTION INTRAMUSCULAR; INTRAVENOUS; SUBCUTANEOUS EVERY 4 HOURS PRN
Status: DISCONTINUED | OUTPATIENT
Start: 2018-10-23 | End: 2018-10-24 | Stop reason: HOSPADM

## 2018-10-23 RX ORDER — ROCURONIUM BROMIDE 10 MG/ML
INJECTION, SOLUTION INTRAVENOUS
Status: DISCONTINUED | OUTPATIENT
Start: 2018-10-23 | End: 2018-10-23

## 2018-10-23 RX ORDER — DIPHENHYDRAMINE HCL 25 MG
25 CAPSULE ORAL EVERY 4 HOURS PRN
Status: DISCONTINUED | OUTPATIENT
Start: 2018-10-23 | End: 2018-10-24 | Stop reason: HOSPADM

## 2018-10-23 RX ADMIN — FENTANYL CITRATE 150 MCG: 50 INJECTION, SOLUTION INTRAMUSCULAR; INTRAVENOUS at 03:10

## 2018-10-23 RX ADMIN — MIDAZOLAM HYDROCHLORIDE 2 MG: 1 INJECTION, SOLUTION INTRAMUSCULAR; INTRAVENOUS at 03:10

## 2018-10-23 RX ADMIN — FENTANYL CITRATE 50 MCG: 50 INJECTION, SOLUTION INTRAMUSCULAR; INTRAVENOUS at 04:10

## 2018-10-23 RX ADMIN — KETOROLAC TROMETHAMINE 30 MG: 30 INJECTION, SOLUTION INTRAMUSCULAR at 10:10

## 2018-10-23 RX ADMIN — GLYCOPYRROLATE 0.6 MG: 0.2 INJECTION INTRAMUSCULAR; INTRAVENOUS at 04:10

## 2018-10-23 RX ADMIN — HYDROMORPHONE HYDROCHLORIDE 1 MG: 2 INJECTION, SOLUTION INTRAMUSCULAR; INTRAVENOUS; SUBCUTANEOUS at 05:10

## 2018-10-23 RX ADMIN — CEFAZOLIN SODIUM 2 G: 2 SOLUTION INTRAVENOUS at 03:10

## 2018-10-23 RX ADMIN — Medication 4 MG: at 04:10

## 2018-10-23 RX ADMIN — PROPOFOL 150 MG: 10 INJECTION, EMULSION INTRAVENOUS at 03:10

## 2018-10-23 RX ADMIN — KETOROLAC TROMETHAMINE 30 MG: 30 INJECTION, SOLUTION INTRAMUSCULAR; INTRAVENOUS at 04:10

## 2018-10-23 RX ADMIN — LIDOCAINE HYDROCHLORIDE 50 MG: 20 INJECTION, SOLUTION EPIDURAL; INFILTRATION; INTRACAUDAL; PERINEURAL at 03:10

## 2018-10-23 RX ADMIN — FENTANYL CITRATE 50 MCG: 50 INJECTION, SOLUTION INTRAMUSCULAR; INTRAVENOUS at 05:10

## 2018-10-23 RX ADMIN — ONDANSETRON 8 MG: 2 INJECTION, SOLUTION INTRAMUSCULAR; INTRAVENOUS at 03:10

## 2018-10-23 RX ADMIN — HYDROCODONE BITARTRATE AND ACETAMINOPHEN 1 TABLET: 5; 325 TABLET ORAL at 09:10

## 2018-10-23 RX ADMIN — ROCURONIUM BROMIDE 40 MG: 10 INJECTION, SOLUTION INTRAVENOUS at 03:10

## 2018-10-23 RX ADMIN — SODIUM CHLORIDE, SODIUM LACTATE, POTASSIUM CHLORIDE, AND CALCIUM CHLORIDE: .6; .31; .03; .02 INJECTION, SOLUTION INTRAVENOUS at 03:10

## 2018-10-23 RX ADMIN — DEXAMETHASONE SODIUM PHOSPHATE 8 MG: 4 INJECTION, SOLUTION INTRAMUSCULAR; INTRAVENOUS at 03:10

## 2018-10-23 NOTE — OP NOTE
Date: 10/23/2018    Procedure: Total laparoscopic hysterectomy/bilateral salpingectomy/right oophorectomy/cystoscopy    Surgeon: Lesly Mendoza MD    Assistant: Lainey Pinto MD    Pre-op Dx: Menorrhagia with irregular cycle  Pelvic pain in female  Bilateral ovarian cysts    Post-op Dx: Menorrhagia with irregular cycle  Pelvic pain in female  Right ovarian cyst    Anesthesia: GETA    EBL: < 20 cc    DVT prophylaxis: Bilateral sequential compression devices    Perioperative antibiotics: Ancef    Specimen: Uterus, cervix, bilateral tubes, right ovary    Indications:  42 yo female with menorrhagia with irregular cycle that has persisted after D&C/Novasure ablation and continued pelvic pain.  Per last ultrasound, persistent right ovarian cyst present and small, resolving left ovarian cyst.    Operative Findings: Normal appearing uterus and tubes.  Right ovary mildly enlarged with simple appearing cyst.  Normal appearing left ovary.  Small,  1 cml left paratubal cyst present.  Normal cystoscopy with efflux of urine from bilateral ureteral orifices.    Complications: None    PROCEDURE IN DETAIL:     The patient was  transferred to the Operating Room, where general endotracheal anesthesia was administered without complication. She was placed in the dorsal lithotomy position in [facility]. The patient's abdomen and perineum were prepped in the normal sterile fashion. A Blancas catheter was inserted into the bladder. The patient was draped.  A 10 mm skin incision was made in the patient's infraumbilical region. Veress needle was inserted into this location with proper intraperitoneal placement, confirmed with the saline drop test and low intraabdominal pressure.  The abdomen was insufflated. The 11 mm infraumbilical trocar was inserted under direct laparoscopic visualization. Next, two 10 mm skin incisions were made in the patient's lower lateral abdomen bilaterally. 10 mm trocars were placed  under direct laparoscopic  visualization. The patient was placed in steep Trendelenburg position. The abdomen and pelvis were surveyed with the above noted findings.  The sonicision was used to excise the left tube.  The sonicision and Gyrus was used to coagulate the patient's uteroovarian ligament on the left. The patient's round ligament was also coagulated on the left. Sonicision was used to create a bladder flap. This was done on the patient's right in which the sonicision and Gyrus was also used to coagulate and cut the infundibulopelvic ligament followed by the round ligament and then created the bladder flap. Next, the peritoneum was further dissected anteriorly and posteriorly. The uterine arteries were skeletonized and then coagulated using the Gyrus. They were cut with the sonicision bilaterally.The sonicision was used to cut along the sponge stick both anterior and posterior to create a colpotomy. The sonicision was then used to further coagulate the cardinal ligaments. The uterus and cervix was then amputated free from the vagina and the uterus was then removed vaginally and a vaginal packing was placed. Endo-stitch was used to place vaginal cuff angle sutures on either side with a #0 Vicryl suture and then #0 vicryl sutures were used to close the entire cuff length in a series of interrupted sutures. The abdomen and pelvis were copiously irrigated and hemostasis confirmed.   Next, the ports were removed under direct laparoscopic visualization with excellent hemostasis noted. Skin incisions were closed using a #3-0 Vicryl in a running fashion. Dermal skin adhesives were used to further re-approximate the skin incisions.  Vaginal packing was removed.  Blancas catheter was removed.  The cystoscope was assembled and primed.  The cystoscope was inserted into the patient's bladder.  There was no noted compromise to the integrity of the bladder.  Efflux of urine from each ureteral orifice was appreciated.  Blancas catheter was replaced. All  instruments were removed. Sponge and instrument count was correct x2. The patient tolerated the procedure well and was transferred to recovery in stable condition.

## 2018-10-23 NOTE — INTERVAL H&P NOTE
Past Medical History:  Past Medical History:   Diagnosis Date    Abnormal Pap smear of cervix     Anxiety     GERD (gastroesophageal reflux disease)     Migraines        Past Surgical History:   Procedure Laterality Date    ABLATION, ENDOMETRIUM, THERMAL N/A 6/13/2018    Performed by Lesly Mendoza MD at [facility]    APPENDECTOMY      DILATION AND CURETTAGE OF UTERUS N/A 6/13/2018    Procedure: DILATION AND CURETTAGE, UTERUS;  Surgeon: Lesly Mendoza MD;  Location: [facility] OR;  Service: OB/GYN;  Laterality: N/A;    DILATION AND CURETTAGE, UTERUS N/A 6/13/2018    Performed by Lesly Mendoza MD at [facility]    THERMAL ABLATION OF ENDOMETRIUM USING HYSTEROSCOPY N/A 6/13/2018    Procedure: ABLATION, ENDOMETRIUM, THERMAL;  Surgeon: Lesly Mendoza MD;  Location: [facility] OR;  Service: OB/GYN;  Laterality: N/A;    TUBAL LIGATION         Social History     Tobacco Use    Smoking status: Never Smoker    Smokeless tobacco: Never Used   Substance Use Topics    Alcohol use: Yes     Alcohol/week: 0.0 oz     Comment: rare    Drug use: No       Family History   Problem Relation Age of Onset    No Known Problems Mother     No Known Problems Father     Breast cancer Maternal Grandmother     Colon cancer Neg Hx     Ovarian cancer Neg Hx        Outpatient Medications Marked as Taking for the 10/23/18 encounter (Hospital Encounter)   Medication Sig Dispense Refill    topiramate (TOPAMAX) 50 MG tablet One daily for headache prevention (Patient taking differently: Take 50 mg by mouth once daily. One daily for headache prevention) 30 tablet 1       Review of patient's allergies indicates:  No Known Allergies    The patient has been examined and the H&P has been reviewed:  I concur with the findings and no changes have occurred since H&P was written.    Patient cleared for Anesthesia: General    Anesthesia/Surgery risks, benefits and alternative options discussed and understood by patient/family.    Active Hospital Problems     Diagnosis  POA    Pelvic pain in female [R10.2]  Yes      Resolved Hospital Problems   No resolved problems to display.

## 2018-10-24 VITALS
DIASTOLIC BLOOD PRESSURE: 58 MMHG | BODY MASS INDEX: 37.7 KG/M2 | HEIGHT: 59 IN | HEART RATE: 76 BPM | SYSTOLIC BLOOD PRESSURE: 104 MMHG | TEMPERATURE: 97 F | RESPIRATION RATE: 16 BRPM | WEIGHT: 187 LBS | OXYGEN SATURATION: 94 %

## 2018-10-24 PROBLEM — R93.89 THICKENED ENDOMETRIUM: Status: RESOLVED | Noted: 2018-06-13 | Resolved: 2018-10-24

## 2018-10-24 LAB
BASOPHILS # BLD AUTO: 0.01 K/UL
BASOPHILS NFR BLD: 0.1 %
DIFFERENTIAL METHOD: ABNORMAL
EOSINOPHIL # BLD AUTO: 0 K/UL
EOSINOPHIL NFR BLD: 0 %
ERYTHROCYTE [DISTWIDTH] IN BLOOD BY AUTOMATED COUNT: 13 %
HCT VFR BLD AUTO: 33.1 %
HGB BLD-MCNC: 10.8 G/DL
LYMPHOCYTES # BLD AUTO: 0.7 K/UL
LYMPHOCYTES NFR BLD: 7.8 %
MCH RBC QN AUTO: 27.8 PG
MCHC RBC AUTO-ENTMCNC: 32.6 G/DL
MCV RBC AUTO: 85 FL
MONOCYTES # BLD AUTO: 0.3 K/UL
MONOCYTES NFR BLD: 3.3 %
NEUTROPHILS # BLD AUTO: 8.2 K/UL
NEUTROPHILS NFR BLD: 88.8 %
PLATELET # BLD AUTO: 376 K/UL
PMV BLD AUTO: 9 FL
RBC # BLD AUTO: 3.89 M/UL
WBC # BLD AUTO: 9.19 K/UL

## 2018-10-24 PROCEDURE — 36415 COLL VENOUS BLD VENIPUNCTURE: CPT

## 2018-10-24 PROCEDURE — 94799 UNLISTED PULMONARY SVC/PX: CPT

## 2018-10-24 PROCEDURE — 63600175 PHARM REV CODE 636 W HCPCS: Performed by: OBSTETRICS & GYNECOLOGY

## 2018-10-24 PROCEDURE — 85025 COMPLETE CBC W/AUTO DIFF WBC: CPT

## 2018-10-24 RX ORDER — HYDROCODONE BITARTRATE AND ACETAMINOPHEN 5; 325 MG/1; MG/1
1 TABLET ORAL EVERY 6 HOURS PRN
Qty: 12 TABLET | Refills: 0 | Status: SHIPPED | OUTPATIENT
Start: 2018-10-24 | End: 2018-11-01 | Stop reason: SDUPTHER

## 2018-10-24 RX ORDER — IBUPROFEN 800 MG/1
800 TABLET ORAL EVERY 8 HOURS PRN
Qty: 30 TABLET | Refills: 0 | Status: SHIPPED | OUTPATIENT
Start: 2018-10-24 | End: 2018-11-01 | Stop reason: SDUPTHER

## 2018-10-24 RX ADMIN — KETOROLAC TROMETHAMINE 30 MG: 30 INJECTION, SOLUTION INTRAMUSCULAR at 06:10

## 2018-10-24 NOTE — DISCHARGE INSTRUCTIONS
Discharge Instructions for Vaginal Hysterectomy   Vaginal hysterectomy is surgery to remove the uterus and often the cervix. It takes 4 to 6 weeks to recover from the procedure. Heres what you need to know about caring for yourself during this time. Follow these and any other instructions you are given.  Two types of vaginal hysterectomy  Vaginal hysterectomy is done through an incision inside the vagina. In some cases, 2 to 3 small incisions are also made in the skin. Instruments are then put through the small incisions to assist the procedure. This is called laparoscopically assisted vaginal hysterectomy or LAVH. If a hysterectomy is done vaginally, the cervix is always removed as well.     Home care  · Plan to rest at home for 3 to 5 days after the surgery.  · Take all prescribed medicine exactly as directed.  · Continue the coughing and deep breathing exercises you learned in the hospital.  · If you had LAVH, you will have several small incisions on your belly. Keep the incisions clean and dry. Change bandages as instructed.  · After LAVH, you may have pain in your shoulder. This is normal and due to gas used to expand your belly during the surgery. The pain may last up to 7 days.  · If you have stitches inside your vagina, they will absorb over time and do not need to be taken out.  · Use sanitary pads to absorb vaginal bleeding or discharge. Light bleeding is likely at first. You may have a brownish discharge for up to 6 weeks.  · Do not use tampons or douches. They can cause infection.  · Avoid constipation, which causes straining to pass stool. Eat fruits, vegetables, and whole-grain foods. Drink at least 8 glasses of fluid each day. If needed, ask your health care provider whether you should use a stool softener.  Activity  Full recovery may take 2 to 4 weeks. This varies from woman to woman. Increase your activities a little bit each day. While you are recovering:  · Do not drive while you are taking  opioid or other narcotic pain medicines.  · Walk as often as you feel able. Walking prevents blood clots from forming. It also helps speed healing.  · Climb stairs slowly. If you get tired, pause every few steps.  · Do not do sports or strenuous activity until your health care provider says its OK.  · Avoid lifting anything heavier than 10 pounds for 4 to 6 weeks.  · Ask others to help with chores and errands.  · Bathe or shower according to your health care providers instructions.  · Do not have sex until your health care provider says its OK.  · Ask your health care provider when you can return to work.  Follow-up care  You will visit the health care provider again to be sure you are healing well. Keep all follow-up appointments. Be sure to tell your health care provider if you have hot flashes, mood swings, or irritability. Medicine may help ease these symptoms.  Life after hysterectomy  Because the procedure removes your uterus, you will no longer have menstrual periods. You will not be able to become pregnant. Also, you may not need Pap tests if your cervix was removed. Your health care provider can discuss these and other changes with you.  When to call your health care provider  Call your health care provider right away if you have any of the following after your surgery:  · Fever of 100.4°F (38°C) or higher  · Vaginal bleeding that is bright red or soaks more than 1 pad in 60 minutes  · Smelly or green-colored discharge from the vagina  · Shortness of breath or chest pain  · Nausea or comiting that continues for more than 1 day or that make it impossible to eat or drink  · Inability to move the bowels for 3 days  · Loose or watery stools 2 or more times a day OR bloody stools  · Trouble urinating or burning during urination  · Severe pain or bloating in your abdomen  · Pain or swelling in your legs  · For LAVH, redness, swelling, drainage, or increasing pain at an incision site  · You feel unusually  depressed or sad after the surgery   Date Last Reviewed: 5/10/2015  © 6380-8672 [employer]. [address] [place] [address], [place] [address]. All rights reserved. This information is not intended as a substitute for professional medical care. Always follow your healthcare professional's instructions.        Understanding Laparoscopic Hysterectomy  Having your uterus (womb) removed is called a hysterectomy. Using a technique called laparoscopy has many benefits. You may spend less time in the hospital and recover faster.  What is hysterectomy?  Hysterectomy removes the uterus. Part or all of the uterus may be taken out. Certain other organs may be removed at the same time. Having your uterus removed means that you wont be able to get pregnant in the future.  What is laparoscopy?  Laparoscopy is a type of surgery. A long, lighted tube with a camera is used. This is called a laparoscope. The scope sends pictures of the inside of the body to a video screen. For the surgery, a few small incisions are made in the abdomen. The scope is inserted through one of the small incisions. Surgical tools are inserted through the other incisions to complete the procedure.  Benefits of laparoscopy  This procedure lets you avoid open surgery. Open surgery requires a larger incision in the abdomen. Compared to open surgery laparoscopy may:  · Require less time in the hospital or surgery center  · Offer a faster recovery  · Cause less internal scarring and smaller visible scars  · Cause less pain after surgery  · Have a lower risk of complications  Risks and possible complications of laparoscopic hysterectomy  · Side effects from anesthesia  · Infection  · Bleeding, with a possible need for a transfusion  · Blood clots  · Damage to the bladder, bowel, ureters, or nearby nerves  · Hernia  · Formation of scar tissue that can cause pain or bowel obstruction often times years later  · Need for a second surgery  Date Last Reviewed:  3/1/2017  © 6620-9490 [employer], [employer]. [address] [place] [address], [place] [address]. All rights reserved. This information is not intended as a substitute for professional medical care. Always follow your healthcare professional's instructions.

## 2018-10-24 NOTE — NURSING
Ambulated to bathroom with assist x1.. Voided 400 cc clear yellow urine.  Tolerated ambulation without dizziness, weakness. Passing flatus.

## 2018-10-24 NOTE — NURSING
Blancas catheter 16 G discontinued.  10 mL balloon deflated.  Tip intact.  Emptied 1000 cc clear light yellow urine.  Instructed to call for assist when need arises to void.  Will measure first void.  Ambulated to bathroom to freshen up.  Lila area cleansed with obstetrical towelettes per self.  Tolerated ambulation from and back to bed without weakness, dizziness, c/o discomfort or SOB.

## 2018-10-24 NOTE — ANESTHESIA POSTPROCEDURE EVALUATION
"Anesthesia Post Evaluation    Patient: Nolvia Garcia    Procedure(s) Performed: Procedure(s) (LRB):  HYSTERECTOMY, TOTAL, LAPAROSCOPIC (N/A)  SALPINGECTOMY, LAPAROSCOPIC (Bilateral)  OOPHORECTOMY, LAPAROSCOPIC (Right)  CYSTOSCOPY (N/A)    Final Anesthesia Type: general  Patient location during evaluation: PACU  Patient participation: Yes- Able to Participate  Level of consciousness: awake and alert, oriented and awake  Post-procedure vital signs: reviewed and stable  Pain management: adequate  Airway patency: patent  PONV status at discharge: No PONV  Anesthetic complications: no      Cardiovascular status: blood pressure returned to baseline, hemodynamically stable and stable  Respiratory status: unassisted, spontaneous ventilation and room air  Hydration status: euvolemic  Follow-up not needed.        Visit Vitals  BP (!) 106/55 (Patient Position: Lying)   Pulse (!) 53   Temp 36.2 °C (97.2 °F) (Oral)   Resp 18   Ht 4' 11" (1.499 m)   Wt 84.8 kg (187 lb)   SpO2 (!) 92%   Breastfeeding? No   BMI 37.77 kg/m²       Pain/Zaynab Score: Pain Assessment Performed: Yes (10/23/2018  5:50 PM)  Presence of Pain: denies (10/23/2018  5:50 PM)  Zaynab Score: 10 (10/23/2018  5:50 PM)        "

## 2018-10-24 NOTE — NURSING
Leg/arm lifts, sit side of bed, stand up at bedside, five steps forward and back; tolerated without dizziness, weakness, c/o discomfort.  Return to bed.  Call bell in reach.

## 2018-10-24 NOTE — PLAN OF CARE
Problem: Patient Care Overview  Goal: Plan of Care Review  Outcome: Ongoing (interventions implemented as appropriate)  Reviewed hand hygiene, cleansing perineum from front to back post sung catheter removal.  Pain control with oral medications.  Observe healing abdominal puncture wounds for signs of infection such as redness, drainage.  Eating without n/v.  Ambulating without weakness, dizziness.  Will call for assist when need to urinate arises.  SCDs and leg/arm, deep breathing exercises encouraged.

## 2018-10-24 NOTE — PROGRESS NOTES
Nursing Notes  Received report from Layne ROSALES.  Patient stable.  On rounding paitnet without c/o pain, SOB, discomfort.  Blancas intact to bag draining clear yellow urine.  LR infusing to right hand.  IV without redness, tenderness.  SCD in place.  Call bell in reach Bed low.  Siderails up x2.      Huddle Comments

## 2018-10-26 NOTE — DISCHARGE SUMMARY
Discharge Note      SUMMARY     Admit Date: 10/23/2018    Attending Physician: Lesly Mendoza MD    Discharge Physician: Lesly Mendoza MD    Discharge Date: 10/25/2018     Admit Diagnosis:  Pelvic pain in female [R10.2]  Menorrhagia with irregular cycle [N92.1]    Final Diagnosis: Same    Procedure: Procedure(s) (LRB):  HYSTERECTOMY, TOTAL, LAPAROSCOPIC (N/A)  SALPINGECTOMY, LAPAROSCOPIC (Bilateral)  OOPHORECTOMY, LAPAROSCOPIC (Right)  CYSTOSCOPY (N/A)    Disposition: Home or Self Care    Condition: Stable    Hospital Course: Pt presented for scheduled Procedure(s) (LRB):  HYSTERECTOMY, TOTAL, LAPAROSCOPIC (N/A)  SALPINGECTOMY, LAPAROSCOPIC (Bilateral)  OOPHORECTOMY, LAPAROSCOPIC (Right)  CYSTOSCOPY (N/A) which she underwent without complications.  Her postoperative course was uncomplicated and she met all discharge criteria on postoperative day #1.    Patient Instructions:   Discharge Medication List as of 10/24/2018 10:31 AM      START taking these medications    Details   !! HYDROcodone-acetaminophen (NORCO) 5-325 mg per tablet Take 1 tablet by mouth every 6 (six) hours as needed for pain., Starting Wed 10/24/2018, Normal      ibuprofen (ADVIL,MOTRIN) 800 MG tablet Take 1 tablet (800 mg total) by mouth every 8 (eight) hours as needed (pain/cramping)., Starting Wed 10/24/2018, Normal       !! - Potential duplicate medications found. Please discuss with provider.      CONTINUE these medications which have NOT CHANGED    Details   topiramate (TOPAMAX) 50 MG tablet One daily for headache prevention, Normal      !! HYDROcodone-acetaminophen (NORCO) 5-325 mg per tablet Take 1 tablet by mouth every 6 (six) hours as needed for Pain., Starting Wed 9/19/2018, Normal      sumatriptan (IMITREX) 100 MG tablet Take one tablet at onset of headache. May repeat in 2 hours if needed. Max of 2 tabs in 24 hours, Normal       !! - Potential duplicate medications found. Please discuss with provider.          Discharge Procedure  Orders (must include Diet, Follow-up, Activity)   Discharge Procedure Orders (must include Diet, Follow-up, Activity)   Diet general        Activity:  Pelvic rest; otherwise, as tolerated    Diet:  Regular    Follow-up Information     Lesly Mendoza MD On 11/1/2018.    Specialty:  Obstetrics  Why:  postop check 2:30   Contact information:  [address] [address]  [phone]

## 2018-11-01 ENCOUNTER — OFFICE VISIT (OUTPATIENT)
Dept: OBSTETRICS AND GYNECOLOGY | Facility: CLINIC | Age: 43
End: 2018-11-01
Payer: MEDICAID

## 2018-11-01 VITALS
SYSTOLIC BLOOD PRESSURE: 112 MMHG | WEIGHT: 186.19 LBS | HEART RATE: 68 BPM | DIASTOLIC BLOOD PRESSURE: 75 MMHG | BODY MASS INDEX: 37.53 KG/M2 | RESPIRATION RATE: 13 BRPM | HEIGHT: 59 IN

## 2018-11-01 DIAGNOSIS — Z09 POSTOP CHECK: Primary | ICD-10-CM

## 2018-11-01 DIAGNOSIS — Z90.721 S/P HYSTERECTOMY WITH OOPHORECTOMY: ICD-10-CM

## 2018-11-01 DIAGNOSIS — Z90.710 S/P HYSTERECTOMY WITH OOPHORECTOMY: ICD-10-CM

## 2018-11-01 DIAGNOSIS — G89.18 POSTOPERATIVE PAIN: ICD-10-CM

## 2018-11-01 PROCEDURE — 99999 PR PBB SHADOW E&M-EST. PATIENT-LVL III: CPT | Mod: PBBFAC,,, | Performed by: OBSTETRICS & GYNECOLOGY

## 2018-11-01 PROCEDURE — 99024 POSTOP FOLLOW-UP VISIT: CPT | Mod: ,,, | Performed by: OBSTETRICS & GYNECOLOGY

## 2018-11-01 PROCEDURE — 99213 OFFICE O/P EST LOW 20 MIN: CPT | Mod: PBBFAC | Performed by: OBSTETRICS & GYNECOLOGY

## 2018-11-01 RX ORDER — IBUPROFEN 800 MG/1
800 TABLET ORAL EVERY 8 HOURS PRN
Qty: 30 TABLET | Refills: 0 | Status: SHIPPED | OUTPATIENT
Start: 2018-11-01 | End: 2021-07-09

## 2018-11-01 RX ORDER — HYDROCODONE BITARTRATE AND ACETAMINOPHEN 5; 325 MG/1; MG/1
1 TABLET ORAL EVERY 6 HOURS PRN
Qty: 15 TABLET | Refills: 0 | Status: ON HOLD | OUTPATIENT
Start: 2018-11-01 | End: 2018-12-23 | Stop reason: HOSPADM

## 2018-11-02 NOTE — PROGRESS NOTES
Subjective:    Patient ID: Nolvia Garcia is a 43 y.o.. Female.    Chief Complaint:   Chief Complaint   Patient presents with    Post-op Evaluation       History of Present Illness:   Nolvia presents today ~ 1 week S/P TLH/bilateral salpingectomy/right oophorectomy for postoperative check. She is tolerating a regular diet with no nausea or vomiting.  She is voiding well and having normal bowel movements.  She denies vaginal bleeding, vaginal discharge, or fever. She has had some postoperative pain which is controlled with the prescribed medications. Pain is worse during the day and has been slightly increased in the past few days. She has had mild menopausal symptoms that are tolerable.     Pathology: benign; reviewed with patient    The following portions of the patient's history were reviewed and updated as appropriate: allergies, current medications, past family history, past medical history, past social history, past surgical history and problem list.      Objective:   Vital Signs:  Vitals:    11/01/18 1432   BP: 112/75   Pulse: 68   Resp: 13       Physical Exam:  General:  alert; oriented; well-nourished  female   Skin:  Skin color, texture, turgor normal. No rashes or lesions   Cardiovascular:  Pulmonary:  Abdomen: Regular rate and rhythm  Clear to auscultation bilaterally  soft, non-tender; bowel sounds normal. Incisions x 3 healing well.  NO erythema, induration, or drainage.   Pelvis: Deferred            Impresssion:  Encounter Diagnoses   Name Primary?    Postop check Yes    S/P hysterectomy with oophorectomy     Postoperative pain          Plan:  Postop check    S/P hysterectomy with oophorectomy    Postoperative pain  -     ibuprofen (ADVIL,MOTRIN) 800 MG tablet; Take 1 tablet (800 mg total) by mouth every 8 (eight) hours as needed (pain/cramping).  Dispense: 30 tablet; Refill: 0  -     HYDROcodone-acetaminophen (NORCO) 5-325 mg per tablet; Take 1 tablet by mouth every 6 (six) hours as needed for  pain.  Dispense: 15 tablet; Refill: 0        Return in ~ 5 weeks or PRN if having postoperative complications  Reviewed postoperative precautions and instructions.  She verbalizes understanding

## 2018-11-12 ENCOUNTER — TELEPHONE (OUTPATIENT)
Dept: UROLOGY | Facility: CLINIC | Age: 43
End: 2018-11-12

## 2018-11-12 ENCOUNTER — OFFICE VISIT (OUTPATIENT)
Dept: UROLOGY | Facility: CLINIC | Age: 43
End: 2018-11-12
Payer: MEDICAID

## 2018-11-12 VITALS
SYSTOLIC BLOOD PRESSURE: 125 MMHG | HEART RATE: 82 BPM | HEIGHT: 59 IN | WEIGHT: 185 LBS | DIASTOLIC BLOOD PRESSURE: 79 MMHG | BODY MASS INDEX: 37.29 KG/M2

## 2018-11-12 DIAGNOSIS — N28.89 LEFT RENAL MASS: Primary | ICD-10-CM

## 2018-11-12 DIAGNOSIS — R31.0 GROSS HEMATURIA: ICD-10-CM

## 2018-11-12 PROCEDURE — 99214 OFFICE O/P EST MOD 30 MIN: CPT | Mod: S$PBB,,, | Performed by: UROLOGY

## 2018-11-12 PROCEDURE — 99999 PR PBB SHADOW E&M-EST. PATIENT-LVL III: CPT | Mod: PBBFAC,,, | Performed by: UROLOGY

## 2018-11-12 PROCEDURE — 99213 OFFICE O/P EST LOW 20 MIN: CPT | Mod: PBBFAC | Performed by: UROLOGY

## 2018-11-12 NOTE — H&P (VIEW-ONLY)
Urology - [facility]    SUBJECTIVE:     Chief Complaint: left renal mass    History of Present Illness:  Nolvia Garcia is a 43 y.o. female who presents to clinic for left small renal mass.  She was referred by Dr. Sims.     CT was originally done in the ED for abdominal pain. She was referred to urology and CT with and without revealed 2.5 cm left midpole, posterior enhancing, solid renal mass. Nephrometry score R-1, E- 2, N-1, A-p, L-3 = 7p.    She reports gross hematuria onset 6 months ago. She also had irregular menstrual bleeding and underwent hysterectomy 10/23/2018. She has still had hematuria, mostly light pink in character. No clots.   No urinary complaints. Denies incomplete emptying. No recent weight loss.    Never smoker.   She works as a .  No family history of kidney or bladder cancer.    History of appendectomy in 1997.    Review of patient's allergies indicates:  No Known Allergies    Past Medical History:   Diagnosis Date    Abnormal Pap smear of cervix     Anxiety     GERD (gastroesophageal reflux disease)     Migraines      Past Surgical History:   Procedure Laterality Date    ABLATION, ENDOMETRIUM, THERMAL N/A 6/13/2018    Performed by Lesly Mendoza MD at [facility]    APPENDECTOMY      CYSTOSCOPY N/A 10/23/2018    Procedure: CYSTOSCOPY;  Surgeon: Lesly Mendoza MD;  Location: [facility];  Service: OB/GYN;  Laterality: N/A;    CYSTOSCOPY N/A 10/23/2018    Performed by [facility]ly [facility]za MD at [facility]    DILATION AND CURETTAGE OF UTERUS N/A 6/13/2018    Procedure: DILATION AND CURETTAGE, UTERUS;  Surgeon: Lesly Mendoza MD;  Location: [facility] OR;  Service: OB/GYN;  Laterality: N/A;    DILATION AND CURETTAGE, UTERUS N/A 6/13/2018    Performed by [facility]za MD at [facility]    HYSTERECTOMY, TOTAL, LAPAROSCOPIC N/A 10/23/2018    Performed by [facility]ly [facility]za MD at [facility]    LAPAROSCOPIC OOPHORECTOMY Right 10/23/2018    Procedure: OOPHORECTOMY, LAPAROSCOPIC;  Surgeon: Lesly  OTTONIEL Mendoza MD;  Location: [facility];  Service: OB/GYN;  Laterality: Right;    LAPAROSCOPIC SALPINGECTOMY Bilateral 10/23/2018    Procedure: SALPINGECTOMY, LAPAROSCOPIC;  Surgeon: Lesly Mendoza MD;  Location: [facility] OR;  Service: OB/GYN;  Laterality: Bilateral;    LAPAROSCOPIC TOTAL HYSTERECTOMY N/A 10/23/2018    Procedure: HYSTERECTOMY, TOTAL, LAPAROSCOPIC;  Surgeon: Lesly Mendoza MD;  Location: [facility] OR;  Service: OB/GYN;  Laterality: N/A;    OOPHORECTOMY, LAPAROSCOPIC Right 10/23/2018    Performed by [facility] at [facility]    SALPINGECTOMY, LAPAROSCOPIC Bilateral 10/23/2018    Performed by [facility]ly [facility]za MD at [facility] OR    THERMAL ABLATION OF ENDOMETRIUM USING HYSTEROSCOPY N/A 6/13/2018    Procedure: ABLATION, ENDOMETRIUM, THERMAL;  Surgeon: Lesly Mendoza MD;  Location: [facility];  Service: OB/GYN;  Laterality: N/A;    TUBAL LIGATION       Family History   Problem Relation Age of Onset    No Known Problems Mother     No Known Problems Father     Breast cancer Maternal Grandmother     Colon cancer Neg Hx     Ovarian cancer Neg Hx      Social History     Tobacco Use    Smoking status: Never Smoker    Smokeless tobacco: Never Used   Substance Use Topics    Alcohol use: Yes     Alcohol/week: 0.0 oz     Comment: rare    Drug use: No      Review of Systems   Constitutional: Negative for chills and fever.   HENT: Negative for sore throat and trouble swallowing.    Eyes: Negative for pain and discharge.   Respiratory: Negative for shortness of breath and wheezing.    Cardiovascular: Negative for chest pain and palpitations.   Gastrointestinal: Negative for abdominal pain, diarrhea, nausea and vomiting.   Genitourinary:        As per HPI   Musculoskeletal: Negative for back pain and joint swelling.   Skin: Negative for rash and wound.   Neurological: Negative for seizures, weakness and headaches.   Psychiatric/Behavioral: Negative for hallucinations. The patient is not nervous/anxious.        OBJECTIVE:  "    Anticoagulation:  No    Estimated body mass index is 37.37 kg/m² as calculated from the following:    Height as of this encounter: 4' 11" (1.499 m).    Weight as of this encounter: 83.9 kg (185 lb).    Vital Signs (Most Recent)  Pulse: 82 (11/12/18 1006)  BP: 125/79 (11/12/18 1006)    Physical Exam   Nursing note and vitals reviewed.  Constitutional: She is oriented to person, place, and time. Vital signs are normal. She appears well-developed and well-nourished. No distress.   Neck: Trachea normal. Neck supple. No tracheal deviation present.   Cardiovascular: Normal rate and intact distal pulses.    Pulmonary/Chest: Effort normal. No accessory muscle usage. No respiratory distress.   Abdominal: Soft. She exhibits no distension. There is no splenomegaly or hepatomegaly. There is no tenderness. There is no CVA tenderness. No hernia.   RLQ appendectomy scar  Bilateral lower abdominal port sites from hysterectomy healing well.   Musculoskeletal: She exhibits no edema or deformity.   Lymphadenopathy:     She has no cervical adenopathy.   Neurological: She is alert and oriented to person, place, and time.   Skin: Skin is warm and dry. No lesion and no rash noted. No cyanosis.     Psychiatric: She has a normal mood and affect. Judgment normal.     BMP  Lab Results   Component Value Date     10/16/2018    K 3.8 10/16/2018     10/16/2018    CO2 25 10/16/2018    BUN 15 10/16/2018    CREATININE 0.8 10/16/2018    CALCIUM 8.5 (L) 10/16/2018    ANIONGAP 8 10/16/2018    ESTGFRAFRICA >60 10/16/2018    EGFRNONAA >60 10/16/2018     Lab Results   Component Value Date    WBC 9.19 10/24/2018    HGB 10.8 (L) 10/24/2018    HCT 33.1 (L) 10/24/2018    MCV 85 10/24/2018     (H) 10/24/2018     Imaging:    CT W/WO personally reviewed and demonstrated a 2.5 cm left midpole, posterior, enhancing, solid renal mass.     ASSESSMENT     1. Left renal mass    2. Gross hematuria      PLAN:     Left renal mass  - Long talk about " renal mass and it's management.  Reviewed images.Discussed options including active surveillance, biopsy, minimally invasive techniques including HFRA, cryo. Discussed open and laparascopic surgical approaches. Discussed partial and radical nephrectomy. Discussed surgery preparation, surgery, recuperation, recovery, exercise restrictions. Discussed risks, benefits, and complications. Answered questions and addressed their concerns.    - imaging personally reviewed and reviewed with the patient. Recommend left robotic partial nephrectomy.   - she will schedule with Ibeth and follow up in H&P clinic prior to surgery    Gross Hematuria  - flexible cystoscopy, urine culture, and urine cytology    I spent 25 minutes with the patient of which more than half was spent in direct consultation with the patient in regards to our treatment and plan.      Harish Vital MD

## 2018-11-12 NOTE — PROGRESS NOTES
Urology - [facility]    SUBJECTIVE:     Chief Complaint: left renal mass    History of Present Illness:  Nolvia Garcia is a 43 y.o. female who presents to clinic for left small renal mass.  She was referred by Dr. Sims.     CT was originally done in the ED for abdominal pain. She was referred to urology and CT with and without revealed 2.5 cm left midpole, posterior enhancing, solid renal mass. Nephrometry score R-1, E- 2, N-1, A-p, L-3 = 7p.    She reports gross hematuria onset 6 months ago. She also had irregular menstrual bleeding and underwent hysterectomy 10/23/2018. She has still had hematuria, mostly light pink in character. No clots.   No urinary complaints. Denies incomplete emptying. No recent weight loss.    Never smoker.   She works as a .  No family history of kidney or bladder cancer.    History of appendectomy in 1997.    Review of patient's allergies indicates:  No Known Allergies    Past Medical History:   Diagnosis Date    Abnormal Pap smear of cervix     Anxiety     GERD (gastroesophageal reflux disease)     Migraines      Past Surgical History:   Procedure Laterality Date    ABLATION, ENDOMETRIUM, THERMAL N/A 6/13/2018    Performed by [facility] OR    APPENDECTOMY      CYSTOSCOPY N/A 10/23/2018    Procedure: CYSTOSCOPY;  Surgeon: Lesly Mendoza MD;  Location: [facility];  Service: OB/GYN;  Laterality: N/A;    CYSTOSCOPY N/A 10/23/2018    Performed by [facility] at [facility] OR    DILATION AND CURETTAGE OF UTERUS N/A 6/13/2018    Procedure: DILATION AND CURETTAGE, UTERUS;  Surgeon: Lesly Mendoza MD;  Location: [facility];  Service: OB/GYN;  Laterality: N/A;    DILATION AND CURETTAGE, UTERUS N/A 6/13/2018    Performed by [facility] at [facility]    HYSTERECTOMY, TOTAL, LAPAROSCOPIC N/A 10/23/2018    Performed by [facility] at [facility]    LAPAROSCOPIC OOPHORECTOMY Right 10/23/2018    Procedure: OOPHORECTOMY, LAPAROSCOPIC;  Surgeon: Lesly  OTTONIEL Mendoza MD;  Location: [facility];  Service: OB/GYN;  Laterality: Right;    LAPAROSCOPIC SALPINGECTOMY Bilateral 10/23/2018    Procedure: SALPINGECTOMY, LAPAROSCOPIC;  Surgeon: Lesly Mendoza MD;  [facility];  Service: OB/GYN;  Laterality: Bilateral;    LAPAROSCOPIC TOTAL HYSTERECTOMY N/A 10/23/2018    Procedure: HYSTERECTOMY, TOTAL, LAPAROSCOPIC;  Surgeon: Lesly Mendoza MD;  Location: [facility];  Service: OB/GYN;  Laterality: N/A;    OOPHORECTOMY, LAPAROSCOPIC Right 10/23/2018    Performed by [facility] MD at [facility] OR    SALPINGECTOMY, LAPAROSCOPIC Bilateral 10/23/2018    Performed by [facility] MD at [facility] OR    THERMAL ABLATION OF ENDOMETRIUM USING HYSTEROSCOPY N/A 6/13/2018    Procedure: ABLATION, ENDOMETRIUM, THERMAL;  Surgeon: Lesly Mendoza MD;  Location: [facility];  Service: OB/GYN;  Laterality: N/A;    TUBAL LIGATION       Family History   Problem Relation Age of Onset    No Known Problems Mother     No Known Problems Father     Breast cancer Maternal Grandmother     Colon cancer Neg Hx     Ovarian cancer Neg Hx      Social History     Tobacco Use    Smoking status: Never Smoker    Smokeless tobacco: Never Used   Substance Use Topics    Alcohol use: Yes     Alcohol/week: 0.0 oz     Comment: rare    Drug use: No      Review of Systems   Constitutional: Negative for chills and fever.   HENT: Negative for sore throat and trouble swallowing.    Eyes: Negative for pain and discharge.   Respiratory: Negative for shortness of breath and wheezing.    Cardiovascular: Negative for chest pain and palpitations.   Gastrointestinal: Negative for abdominal pain, diarrhea, nausea and vomiting.   Genitourinary:        As per HPI   Musculoskeletal: Negative for back pain and joint swelling.   Skin: Negative for rash and wound.   Neurological: Negative for seizures, weakness and headaches.   Psychiatric/Behavioral: Negative for hallucinations. The patient is not nervous/anxious.        OBJECTIVE:  "    Anticoagulation:  No    Estimated body mass index is 37.37 kg/m² as calculated from the following:    Height as of this encounter: 4' 11" (1.499 m).    Weight as of this encounter: 83.9 kg (185 lb).    Vital Signs (Most Recent)  Pulse: 82 (11/12/18 1006)  BP: 125/79 (11/12/18 1006)    Physical Exam   Nursing note and vitals reviewed.  Constitutional: She is oriented to person, place, and time. Vital signs are normal. She appears well-developed and well-nourished. No distress.   Neck: Trachea normal. Neck supple. No tracheal deviation present.   Cardiovascular: Normal rate and intact distal pulses.    Pulmonary/Chest: Effort normal. No accessory muscle usage. No respiratory distress.   Abdominal: Soft. She exhibits no distension. There is no splenomegaly or hepatomegaly. There is no tenderness. There is no CVA tenderness. No hernia.   RLQ appendectomy scar  Bilateral lower abdominal port sites from hysterectomy healing well.   Musculoskeletal: She exhibits no edema or deformity.   Lymphadenopathy:     She has no cervical adenopathy.   Neurological: She is alert and oriented to person, place, and time.   Skin: Skin is warm and dry. No lesion and no rash noted. No cyanosis.     Psychiatric: She has a normal mood and affect. Judgment normal.     BMP  Lab Results   Component Value Date     10/16/2018    K 3.8 10/16/2018     10/16/2018    CO2 25 10/16/2018    BUN 15 10/16/2018    CREATININE 0.8 10/16/2018    CALCIUM 8.5 (L) 10/16/2018    ANIONGAP 8 10/16/2018    ESTGFRAFRICA >60 10/16/2018    EGFRNONAA >60 10/16/2018     Lab Results   Component Value Date    WBC 9.19 10/24/2018    HGB 10.8 (L) 10/24/2018    HCT 33.1 (L) 10/24/2018    MCV 85 10/24/2018     (H) 10/24/2018     Imaging:    CT W/WO personally reviewed and demonstrated a 2.5 cm left midpole, posterior, enhancing, solid renal mass.     ASSESSMENT     1. Left renal mass    2. Gross hematuria      PLAN:     Left renal mass  - Long talk about " renal mass and it's management.  Reviewed images.Discussed options including active surveillance, biopsy, minimally invasive techniques including HFRA, cryo. Discussed open and laparascopic surgical approaches. Discussed partial and radical nephrectomy. Discussed surgery preparation, surgery, recuperation, recovery, exercise restrictions. Discussed risks, benefits, and complications. Answered questions and addressed their concerns.    - imaging personally reviewed and reviewed with the patient. Recommend left robotic partial nephrectomy.   - she will schedule with Ibeth and follow up in H&P clinic prior to surgery    Gross Hematuria  - flexible cystoscopy, urine culture, and urine cytology    I spent 25 minutes with the patient of which more than half was spent in direct consultation with the patient in regards to our treatment and plan.      Harish Vital MD

## 2018-11-28 ENCOUNTER — OFFICE VISIT (OUTPATIENT)
Dept: OBSTETRICS AND GYNECOLOGY | Facility: CLINIC | Age: 43
End: 2018-11-28
Payer: MEDICAID

## 2018-11-28 VITALS
DIASTOLIC BLOOD PRESSURE: 90 MMHG | HEART RATE: 80 BPM | SYSTOLIC BLOOD PRESSURE: 120 MMHG | WEIGHT: 185.38 LBS | BODY MASS INDEX: 36.4 KG/M2 | RESPIRATION RATE: 18 BRPM | HEIGHT: 60 IN

## 2018-11-28 DIAGNOSIS — Z90.710 S/P HYSTERECTOMY: ICD-10-CM

## 2018-11-28 DIAGNOSIS — Z90.721 S/P RIGHT OOPHORECTOMY: ICD-10-CM

## 2018-11-28 DIAGNOSIS — N89.8 VAGINAL DISCHARGE: ICD-10-CM

## 2018-11-28 DIAGNOSIS — Z09 POSTOP CHECK: Primary | ICD-10-CM

## 2018-11-28 DIAGNOSIS — Z90.79 STATUS POST BILATERAL SALPINGECTOMY: ICD-10-CM

## 2018-11-28 PROCEDURE — 99213 OFFICE O/P EST LOW 20 MIN: CPT | Mod: PBBFAC | Performed by: OBSTETRICS & GYNECOLOGY

## 2018-11-28 PROCEDURE — 87660 TRICHOMONAS VAGIN DIR PROBE: CPT

## 2018-11-28 PROCEDURE — 99999 PR PBB SHADOW E&M-EST. PATIENT-LVL III: CPT | Mod: PBBFAC,,, | Performed by: OBSTETRICS & GYNECOLOGY

## 2018-11-28 PROCEDURE — 99024 POSTOP FOLLOW-UP VISIT: CPT | Mod: ,,, | Performed by: OBSTETRICS & GYNECOLOGY

## 2018-11-28 NOTE — PROGRESS NOTES
Subjective:    Patient ID: Nolvia Garcia is a 43 y.o.. Female.    Chief Complaint:   Chief Complaint   Patient presents with    Post-op Evaluation     5 weeks PO; TLH, RSO       History of Present Illness:   Nolvia presents today six weeks postop S/P TLH/bilateral salpingectomy/right oophorectomy for postoperative check. She has no complaints today. She is tolerating a regular diet with no nausea or vomiting.  She is voiding well and having normal bowel movements.  She denies vaginal bleeding, vaginal discharge, or fever. She denies increasing pain and is no longer requiring pain medication.  She denies any significant menopausal symptoms.  She is scheduled for partial nephrectomy next month.    Pathology: benign; previously reviewed with patient    The following portions of the patient's history were reviewed and updated as appropriate: allergies, current medications, past family history, past medical history, past social history, past surgical history and problem list.      Objective:   Vital Signs:  Vitals:    11/28/18 1004   BP: (!) 120/90   Pulse: 80   Resp: 18       Physical Exam:  General:  alert,normal appearing female   Abdomen:  soft, non-tender; bowel sounds normal. Incisions x 3 well healed. No erythema, induration, or drainage   Pelvis: External genitalia: normal general appearance  Urinary system: urethral meatus normal, bladder nontender  Vaginal: cuff intact and nontender; scant white discharge present; unable to penetrate cuff with cotton tipped swab; no palpable masses at cuff  Cervix: surgically absent  Uterus: surgically absent  Adnexa: normal bimanual exam     Impresssion:  Encounter Diagnoses   Name Primary?    Postop check Yes    S/P hysterectomy     Status post bilateral salpingectomy     S/P right oophorectomy     Vaginal discharge          Plan:  Postop check    S/P hysterectomy    Status post bilateral salpingectomy    S/P right oophorectomy    Vaginal discharge  -     Vaginosis  Screen by DNA Probe      Will follow up vaginosis screen and manage accordingly.  Return PRN if having postoperative complications  Reviewed postoperative precautions and instructions.  She verbalizes understanding

## 2018-12-04 ENCOUNTER — HOSPITAL ENCOUNTER (OUTPATIENT)
Dept: UROLOGY | Facility: HOSPITAL | Age: 43
Discharge: HOME | End: 2018-12-04
Attending: UROLOGY
Payer: MEDICAID

## 2018-12-04 VITALS
HEART RATE: 83 BPM | RESPIRATION RATE: 18 BRPM | TEMPERATURE: 98 F | DIASTOLIC BLOOD PRESSURE: 72 MMHG | BODY MASS INDEX: 36.36 KG/M2 | SYSTOLIC BLOOD PRESSURE: 122 MMHG | WEIGHT: 185.19 LBS | HEIGHT: 60 IN

## 2018-12-04 DIAGNOSIS — R31.0 GROSS HEMATURIA: Primary | ICD-10-CM

## 2018-12-04 LAB
BACTERIA #/AREA URNS AUTO: NORMAL /HPF
BILIRUB UR QL STRIP: NEGATIVE
CLARITY UR REFRACT.AUTO: CLEAR
COLOR UR AUTO: ABNORMAL
GLUCOSE UR QL STRIP: NEGATIVE
HGB UR QL STRIP: ABNORMAL
KETONES UR QL STRIP: NEGATIVE
LEUKOCYTE ESTERASE UR QL STRIP: ABNORMAL
MICROSCOPIC COMMENT: NORMAL
NITRITE UR QL STRIP: NEGATIVE
PH UR STRIP: 7 [PH] (ref 5–8)
PROT UR QL STRIP: NEGATIVE
RBC #/AREA URNS AUTO: 2 /HPF (ref 0–4)
SP GR UR STRIP: 1 (ref 1–1.03)
SQUAMOUS #/AREA URNS AUTO: 4 /HPF
URN SPEC COLLECT METH UR: ABNORMAL
WBC #/AREA URNS AUTO: 1 /HPF (ref 0–5)

## 2018-12-04 PROCEDURE — 52000 CYSTOURETHROSCOPY: CPT | Mod: ,,, | Performed by: UROLOGY

## 2018-12-04 PROCEDURE — 87086 URINE CULTURE/COLONY COUNT: CPT

## 2018-12-04 PROCEDURE — 52000 CYSTOURETHROSCOPY: CPT

## 2018-12-04 PROCEDURE — 81001 URINALYSIS AUTO W/SCOPE: CPT

## 2018-12-04 RX ORDER — LIDOCAINE HYDROCHLORIDE 20 MG/ML
10 JELLY TOPICAL
Status: COMPLETED | OUTPATIENT
Start: 2018-12-04 | End: 2018-12-04

## 2018-12-04 RX ADMIN — LIDOCAINE HYDROCHLORIDE 10 ML: 20 JELLY TOPICAL at 10:12

## 2018-12-04 NOTE — PROCEDURES
Procedure: Flexible cysto-uretheroscopy    Pre Procedure Diagnosis:gross hematuria      Post Procedure Diagnosis:Normal cystoscopy    Surgeon: Justin Vo MD    Anesthesia: 2% uro-jet lidocaine jelly for local analgesia    Flexible cysto-urethroscopy was performed after consent was obtained.  The risks and benefits were explained.    2% lidocaine urojet was used for local analgesia.  The genitalia was prepped and draped in the sterile fashion with betadine.    The flexible scope was advanced into the urethra and into the bladder.  Bilateral ureteral orifice were evaluated and noted to be normal with clear efflux.  The bladder was completely surveyed in a systematic fashion.   No bladder tumors or lesions were seen.  No strictures were noted.    The patient tolerated the procedure well without complication.    They will follow up on 12/21/18 for surgery, robotic partial nephrectomy.  ua and urine culture today.

## 2018-12-04 NOTE — PATIENT INSTRUCTIONS
What to Expect After a Cystoscopy  For the next 24-48 hours, you may feel a mild burning when you urinate. This burning is normal and expected. Drink plenty of water to dilute the urine to help relieve the burning sensation. You may also see a small amount of blood in your urine after the procedure.    Unless you are already taking antibiotics, you may be given an antibiotic after the test to prevent infection.    Signs and Symptoms to Report  Call the [facility] at [phone] if you develop any of the following:  · Fever of 101 degrees or higher  · Chills or persistent bleeding  · Inability to urinate

## 2018-12-05 LAB — BACTERIA UR CULT: NORMAL

## 2018-12-05 RX ORDER — METRONIDAZOLE 500 MG/1
500 TABLET ORAL EVERY 12 HOURS
Qty: 14 TABLET | Refills: 0 | Status: SHIPPED | OUTPATIENT
Start: 2018-12-05 | End: 2018-12-12

## 2018-12-07 ENCOUNTER — ANESTHESIA EVENT (OUTPATIENT)
Dept: SURGERY | Facility: HOSPITAL | Age: 43
DRG: 658 | End: 2018-12-07
Payer: MEDICAID

## 2018-12-07 DIAGNOSIS — Z01.818 PREOPERATIVE TESTING: Primary | ICD-10-CM

## 2018-12-07 NOTE — ANESTHESIA PREPROCEDURE EVALUATION
Corina Mcbride, RN   Registered Nurse      Pre Admission Screening   Signed                     Anesthesia Assessment: Preoperative EQUATION     Planned Procedure: Procedure(s) (LRB):  ROBOTIC NEPHRECTOMY, PARTIAL (Left)  Requested Anesthesia Type:General  Surgeon: Justin Vo MD  Service: Urology  Known or anticipated Date of Surgery:12/21/2018     Surgeon notes: reviewed     Electronic QUestionnaire Assessment completed via nurse interview with patient.      NO AQ        Triage considerations:      The patient has no apparent active cardiac condition (No unstable coronary Syndrome such as severe unstable angina or recent [<1 month] myocardial infarction, decompensated CHF, severe valvular   disease or significant arrhythmia)     Previous anesthesia records:GETA and No problems   10/23/2018  [facility] with BSO, Cysto  Airway/Jaw/Neck:  Airway Findings: Mouth Opening: Normal Tongue: Normal  General Airway Assessment: Adult  Mallampati: III  TM Distance: Normal, at least 6 cm  Jaw/Neck Findings: Neck ROM: Normal ROM         10/23/18 Placement Time: 1524 Method of Intubation: Direct laryngoscopy Inserted by: CRNA Airway Device: Endotracheal Tube Mask Ventilation: Easy Intubated: Postinduction Blade: Jones #2 Airway Device Size: 7.5 Style: Cuffed Cuff Inflation: Minimal occlusive pressure Placement Verified By: Auscultation;Colorimetric EtCO2 device Grade: Grade I Complicating Factors: None Findings Post-Intubation: Positive EtCO2;Bilateral breath sounds;Atraumatic/Condition of teeth unchanged Depth of Insertion (cm): 21 Secured at: Lips Complications: None Breath Sounds: Equal Bilateral Insertion attempts (enter comment if more than 2 attempts): 1        Last PCP note: 3-6 months ago , within [facility]  Subspecialty notes: Neurology, Urology, OBGYN     Other important co-morbidities: Anxiety, GERD, Migraine     Tests already available:  Available tests,  within 3 months , within [facility] .  10/24/2018 CBC, 10/16/2018 CMP, 9/2018 EKG                            Instructions given. (See in Nurse's note)     Optimization:  Anesthesia Preop Clinic Assessment  Indicated: Discussed with Dr. Simental, POC visit not necessary           Plan:    Testing:  T&S (AM of surgery, pt lives out of town)                           Patient  has previously scheduled Medical Appointment: Not at this time     Navigation:    Tests Scheduled. (AM of surgery)                          Straight Line to surgery.                                                                                                                           12/07/2018  Nolvia Garcia is a 43 y.o., female.    Anesthesia Evaluation    I have reviewed the Patient Summary Reports.    I have reviewed the Nursing Notes.   I have reviewed the Medications.     Review of Systems  Anesthesia Hx:  No problems with previous Anesthesia  History of prior surgery of interest to airway management or planning: Previous anesthesia: General 10/23/2018 [facility] with BSO, Cysto with general anesthesia.  Procedure performed at an [facility]. Denies Family Hx of Anesthesia complications.   Denies Personal Hx of Anesthesia complications.   Social:  Non-Smoker, No Alcohol Use    Hematology/Oncology:         -- Anemia: Current/Recent Cancer.   EENT/Dental:  EENT/Dental Normal Denies Active Dental Problems  Denies Jaw Problems   Cardiovascular:  Cardiovascular Normal Exercise tolerance: good  ECG has been reviewed.  Functional Capacity good / => 4 METS    Pulmonary:  Pulmonary Normal  Denies Asthma.  Denies Shortness of breath.  Denies Recent URI.    Renal/:   RCC? Neoplasm/Tumor, Renal Neoplasm (Left renal mass).    Hepatic/GI:   GERD, well controlled  Esophageal / Stomach Disorders Gerd    Musculoskeletal:  Musculoskeletal Normal    Neurological:   Headaches  Dx of Headaches, Migraine Headache   Endocrine:  Endocrine Normal  Metabolic Disorders, Obesity / BMI >  30  Dermatological:  Skin Normal    Psych:  Psychiatric Normal           Physical Exam  General:  Well nourished, Obesity    Airway/Jaw/Neck:  Airway Findings: Mouth Opening: Normal Tongue: Normal  General Airway Assessment: Adult  Mallampati: II  TM Distance: Normal, at least 6 cm        Eyes/Ears/Nose:  EYES/EARS/NOSE FINDINGS: Normal   Dental:  DENTAL FINDINGS: Normal   Chest/Lungs:  Chest/Lungs Clear    Heart/Vascular:  Heart Findings: Normal Heart murmur: negative Vascular Findings: Normal    Abdomen:  Abdomen Findings: Normal    Musculoskeletal:  Musculoskeletal Findings: Normal   Skin:  Skin Findings: Normal    Mental Status:  Mental Status Findings: Normal        Anesthesia Plan  Type of Anesthesia, risks & benefits discussed:  Anesthesia Type:  general, regional  Patient's Preference:   Intra-op Monitoring Plan: standard ASA monitors and arterial line  Intra-op Monitoring Plan Comments:   Post Op Pain Control Plan: per primary service following discharge from PACU, IV/PO Opioids PRN, multimodal analgesia and peripheral nerve block  Post Op Pain Control Plan Comments:   Induction:   IV  Beta Blocker:  Patient is not currently on a Beta-Blocker (No further documentation required).       Informed Consent: Patient understands risks and agrees with Anesthesia plan.  Questions answered. Anesthesia consent signed with patient.  ASA Score: 2     Day of Surgery Review of History & Physical:    H&P update referred to the surgeon.         Ready For Surgery From Anesthesia Perspective.

## 2018-12-07 NOTE — PRE ADMISSION SCREENING
Anesthesia Assessment: Preoperative EQUATION    Planned Procedure: Procedure(s) (LRB):  ROBOTIC NEPHRECTOMY, PARTIAL (Left)  Requested Anesthesia Type:General  Surgeon: Justin Vo MD  Service: Urology  Known or anticipated Date of Surgery:12/21/2018    Surgeon notes: reviewed    Electronic QUestionnaire Assessment completed via nurse interview with patient.      NO AQ      Triage considerations:     The patient has no apparent active cardiac condition (No unstable coronary Syndrome such as severe unstable angina or recent [<1 month] myocardial infarction, decompensated CHF, severe valvular   disease or significant arrhythmia)    Previous anesthesia records:GETA and No problems   10/23/2018  [facility] with BSO, Cysto  Airway/Jaw/Neck:  Airway Findings: Mouth Opening: Normal Tongue: Normal  General Airway Assessment: Adult  Mallampati: III  TM Distance: Normal, at least 6 cm  Jaw/Neck Findings: Neck ROM: Normal ROM        10/23/18 Placement Time: 1524 Method of Intubation: Direct laryngoscopy Inserted by: CRNA Airway Device: Endotracheal Tube Mask Ventilation: Easy Intubated: Postinduction Blade: Jones #2 Airway Device Size: 7.5 Style: Cuffed Cuff Inflation: Minimal occlusive pressure Placement Verified By: Auscultation;Colorimetric EtCO2 device Grade: Grade I Complicating Factors: None Findings Post-Intubation: Positive EtCO2;Bilateral breath sounds;Atraumatic/Condition of teeth unchanged Depth of Insertion (cm): 21 Secured at: Lips Complications: None Breath Sounds: Equal Bilateral Insertion attempts (enter comment if more than 2 attempts): 1      [facility]  Subspecialty notes: Neurology, Urology, OBGYN    Other important co-morbidities: Anxiety, GERD, Migraine     Tests already available:  Available tests,  within 3 months , within [facility] . 10/24/2018 CBC, 10/16/2018 CMP, 9/2018 EKG            Instructions given. (See in Nurse's note)    Optimization:   Anesthesia Preop Clinic Assessment  Indicated: Discussed with Dr. Simental, POC visit not necessary    Plan:    Testing:  T&S (AM of surgery, pt lives out of town)     Patient  has previously scheduled Medical Appointment: Not at this time    Navigation:  Tests Scheduled. (AM of surgery)    Straight Line to surgery.

## 2018-12-11 ENCOUNTER — TELEPHONE (OUTPATIENT)
Dept: OBSTETRICS AND GYNECOLOGY | Facility: CLINIC | Age: 43
End: 2018-12-11

## 2018-12-11 NOTE — TELEPHONE ENCOUNTER
----- Message from Ilsa Verma MA sent at 12/11/2018  3:49 PM CST -----  Contact: self  Nolvia Garcia  MRN: [id number]  Home Phone      [phone]  Work Phone      Not on file.  Mobile          [phone]    Patient Care Team:  Ten Hunt MD as PCP - General (Internal Medicine)  Lesly Mendoza MD as Obstetrician (Obstetrics)  OB? No  What phone number can you be reached at?  [phone]  Message:  Stated Rx that was called in for FLAGYL into [facility].  [facility] is stating they do not have the medication and have not had it in 3 months.  Would like to know if something else can be called in.

## 2018-12-11 NOTE — TELEPHONE ENCOUNTER
Patient notified [facility] and [facility] have Flagyl in stock. Patient states she will contact [facility] to transfer prescription.

## 2018-12-20 ENCOUNTER — TELEPHONE (OUTPATIENT)
Dept: UROLOGY | Facility: CLINIC | Age: 43
End: 2018-12-20

## 2018-12-20 NOTE — TELEPHONE ENCOUNTER
Called pt to confirm arrival time of 930a for procedure on 12/21/2018. Gave pt NPO instructions and gave pt opportunity to ask questions. Pt verbalized understanding.

## 2018-12-21 ENCOUNTER — HOSPITAL ENCOUNTER (INPATIENT)
Facility: HOSPITAL | Age: 43
LOS: 2 days | Discharge: HOME | DRG: 658 | End: 2018-12-23
Attending: UROLOGY | Admitting: UROLOGY
Payer: MEDICAID

## 2018-12-21 ENCOUNTER — ANESTHESIA (OUTPATIENT)
Dept: SURGERY | Facility: HOSPITAL | Age: 43
DRG: 658 | End: 2018-12-21
Payer: MEDICAID

## 2018-12-21 DIAGNOSIS — Z01.818 PREOPERATIVE TESTING: ICD-10-CM

## 2018-12-21 DIAGNOSIS — N28.89 RENAL MASS: Primary | ICD-10-CM

## 2018-12-21 LAB
ABO + RH BLD: NORMAL
BLD GP AB SCN CELLS X3 SERPL QL: NORMAL

## 2018-12-21 PROCEDURE — 25000003 PHARM REV CODE 250: Performed by: UROLOGY

## 2018-12-21 PROCEDURE — 76942 ECHO GUIDE FOR BIOPSY: CPT | Performed by: ANESTHESIOLOGY

## 2018-12-21 PROCEDURE — D9220A PRA ANESTHESIA: Mod: ANES,,, | Performed by: ANESTHESIOLOGY

## 2018-12-21 PROCEDURE — D9220A PRA ANESTHESIA: Mod: CRNA,,, | Performed by: REGISTERED NURSE

## 2018-12-21 PROCEDURE — 27000221 HC OXYGEN, UP TO 24 HOURS

## 2018-12-21 PROCEDURE — 63600175 PHARM REV CODE 636 W HCPCS: Performed by: ANESTHESIOLOGY

## 2018-12-21 PROCEDURE — 36000712 HC OR TIME LEV V 1ST 15 MIN: Performed by: UROLOGY

## 2018-12-21 PROCEDURE — 64461 PVB THORACIC SINGLE INJ SITE: CPT | Mod: 59,LT,, | Performed by: ANESTHESIOLOGY

## 2018-12-21 PROCEDURE — 88307 TISSUE EXAM BY PATHOLOGIST: CPT | Performed by: PATHOLOGY

## 2018-12-21 PROCEDURE — 27201037 HC PRESSURE MONITORING SET UP

## 2018-12-21 PROCEDURE — 50543 LAPARO PARTIAL NEPHRECTOMY: CPT | Mod: LT,,, | Performed by: UROLOGY

## 2018-12-21 PROCEDURE — 94761 N-INVAS EAR/PLS OXIMETRY MLT: CPT

## 2018-12-21 PROCEDURE — 27201423 OPTIME MED/SURG SUP & DEVICES STERILE SUPPLY: Performed by: UROLOGY

## 2018-12-21 PROCEDURE — 36000713 HC OR TIME LEV V EA ADD 15 MIN: Performed by: UROLOGY

## 2018-12-21 PROCEDURE — 76998 US GUIDE INTRAOP: CPT | Mod: 26,,, | Performed by: UROLOGY

## 2018-12-21 PROCEDURE — 27200750 HC INSULATED NEEDLE/ STIMUPLEX: Performed by: ANESTHESIOLOGY

## 2018-12-21 PROCEDURE — 50543 PR LAP,PARTIAL NEPHRECTOMY: ICD-10-PCS | Mod: LT,,, | Performed by: UROLOGY

## 2018-12-21 PROCEDURE — 63600175 PHARM REV CODE 636 W HCPCS: Performed by: STUDENT IN AN ORGANIZED HEALTH CARE EDUCATION/TRAINING PROGRAM

## 2018-12-21 PROCEDURE — 11000001 HC ACUTE MED/SURG PRIVATE ROOM

## 2018-12-21 PROCEDURE — 63600175 PHARM REV CODE 636 W HCPCS: Performed by: UROLOGY

## 2018-12-21 PROCEDURE — 63600175 PHARM REV CODE 636 W HCPCS: Performed by: REGISTERED NURSE

## 2018-12-21 PROCEDURE — 71000039 HC RECOVERY, EACH ADD'L HOUR: Performed by: UROLOGY

## 2018-12-21 PROCEDURE — 25000003 PHARM REV CODE 250: Performed by: REGISTERED NURSE

## 2018-12-21 PROCEDURE — 76998 PR  ULTRASONIC GUIDANCE, INTRAOPERATIVE: ICD-10-PCS | Mod: 26,,, | Performed by: UROLOGY

## 2018-12-21 PROCEDURE — 88307 TISSUE EXAM BY PATHOLOGIST: CPT | Mod: 26,,, | Performed by: PATHOLOGY

## 2018-12-21 PROCEDURE — 71000033 HC RECOVERY, INTIAL HOUR: Performed by: UROLOGY

## 2018-12-21 PROCEDURE — 37000009 HC ANESTHESIA EA ADD 15 MINS: Performed by: UROLOGY

## 2018-12-21 PROCEDURE — A4216 STERILE WATER/SALINE, 10 ML: HCPCS | Performed by: REGISTERED NURSE

## 2018-12-21 PROCEDURE — 37000008 HC ANESTHESIA 1ST 15 MINUTES: Performed by: UROLOGY

## 2018-12-21 PROCEDURE — 36620 INSERTION CATHETER ARTERY: CPT | Mod: 59,,, | Performed by: ANESTHESIOLOGY

## 2018-12-21 PROCEDURE — 86850 RBC ANTIBODY SCREEN: CPT

## 2018-12-21 PROCEDURE — 88307 TISSUE SPECIMEN TO PATHOLOGY - SURGERY: ICD-10-PCS | Mod: 26,,, | Performed by: PATHOLOGY

## 2018-12-21 RX ORDER — SODIUM CHLORIDE 9 MG/ML
INJECTION, SOLUTION INTRAVENOUS CONTINUOUS
Status: DISCONTINUED | OUTPATIENT
Start: 2018-12-21 | End: 2018-12-22

## 2018-12-21 RX ORDER — FENTANYL CITRATE 50 UG/ML
INJECTION, SOLUTION INTRAMUSCULAR; INTRAVENOUS
Status: DISCONTINUED | OUTPATIENT
Start: 2018-12-21 | End: 2018-12-21

## 2018-12-21 RX ORDER — CEFAZOLIN SODIUM 1 G/3ML
2 INJECTION, POWDER, FOR SOLUTION INTRAMUSCULAR; INTRAVENOUS
Status: COMPLETED | OUTPATIENT
Start: 2018-12-21 | End: 2018-12-21

## 2018-12-21 RX ORDER — DEXAMETHASONE SODIUM PHOSPHATE 4 MG/ML
INJECTION, SOLUTION INTRA-ARTICULAR; INTRALESIONAL; INTRAMUSCULAR; INTRAVENOUS; SOFT TISSUE
Status: DISPENSED
Start: 2018-12-21 | End: 2018-12-21

## 2018-12-21 RX ORDER — PREGABALIN 75 MG/1
75 CAPSULE ORAL NIGHTLY
Status: DISCONTINUED | OUTPATIENT
Start: 2018-12-21 | End: 2018-12-23 | Stop reason: HOSPADM

## 2018-12-21 RX ORDER — MIDAZOLAM HYDROCHLORIDE 1 MG/ML
INJECTION, SOLUTION INTRAMUSCULAR; INTRAVENOUS
Status: DISCONTINUED | OUTPATIENT
Start: 2018-12-21 | End: 2018-12-21

## 2018-12-21 RX ORDER — PREGABALIN 50 MG/1
150 CAPSULE ORAL
Status: COMPLETED | OUTPATIENT
Start: 2018-12-21 | End: 2018-12-21

## 2018-12-21 RX ORDER — ONDANSETRON 2 MG/ML
INJECTION INTRAMUSCULAR; INTRAVENOUS
Status: DISCONTINUED | OUTPATIENT
Start: 2018-12-21 | End: 2018-12-21

## 2018-12-21 RX ORDER — KETAMINE HCL IN 0.9 % NACL 50 MG/5 ML
SYRINGE (ML) INTRAVENOUS
Status: DISCONTINUED | OUTPATIENT
Start: 2018-12-21 | End: 2018-12-21

## 2018-12-21 RX ORDER — CELECOXIB 200 MG/1
400 CAPSULE ORAL
Status: COMPLETED | OUTPATIENT
Start: 2018-12-21 | End: 2018-12-21

## 2018-12-21 RX ORDER — DIPHENHYDRAMINE HCL 25 MG
25 CAPSULE ORAL EVERY 6 HOURS PRN
Status: DISCONTINUED | OUTPATIENT
Start: 2018-12-21 | End: 2018-12-23 | Stop reason: HOSPADM

## 2018-12-21 RX ORDER — PANTOPRAZOLE SODIUM 40 MG/1
40 TABLET, DELAYED RELEASE ORAL DAILY
Status: DISCONTINUED | OUTPATIENT
Start: 2018-12-22 | End: 2018-12-23 | Stop reason: HOSPADM

## 2018-12-21 RX ORDER — FENTANYL CITRATE 50 UG/ML
25 INJECTION, SOLUTION INTRAMUSCULAR; INTRAVENOUS EVERY 5 MIN PRN
Status: DISCONTINUED | OUTPATIENT
Start: 2018-12-21 | End: 2018-12-21 | Stop reason: HOSPADM

## 2018-12-21 RX ORDER — TOPIRAMATE 25 MG/1
50 TABLET ORAL DAILY
Status: DISCONTINUED | OUTPATIENT
Start: 2018-12-22 | End: 2018-12-23 | Stop reason: HOSPADM

## 2018-12-21 RX ORDER — ACETAMINOPHEN 500 MG
1000 TABLET ORAL EVERY 6 HOURS
Status: DISCONTINUED | OUTPATIENT
Start: 2018-12-21 | End: 2018-12-23 | Stop reason: HOSPADM

## 2018-12-21 RX ORDER — NEOSTIGMINE METHYLSULFATE 1 MG/ML
INJECTION, SOLUTION INTRAVENOUS
Status: DISCONTINUED | OUTPATIENT
Start: 2018-12-21 | End: 2018-12-21

## 2018-12-21 RX ORDER — CLONIDINE 100 UG/ML
INJECTION, SOLUTION EPIDURAL
Status: DISPENSED
Start: 2018-12-21 | End: 2018-12-21

## 2018-12-21 RX ORDER — GLYCOPYRROLATE 0.2 MG/ML
INJECTION INTRAMUSCULAR; INTRAVENOUS
Status: DISCONTINUED | OUTPATIENT
Start: 2018-12-21 | End: 2018-12-21

## 2018-12-21 RX ORDER — SUMATRIPTAN 50 MG/1
100 TABLET, FILM COATED ORAL NIGHTLY PRN
Status: DISCONTINUED | OUTPATIENT
Start: 2018-12-21 | End: 2018-12-23 | Stop reason: HOSPADM

## 2018-12-21 RX ORDER — LIDOCAINE HYDROCHLORIDE ANHYDROUS AND DEXTROSE MONOHYDRATE .8; 5 G/100ML; G/100ML
INJECTION, SOLUTION INTRAVENOUS CONTINUOUS PRN
Status: DISCONTINUED | OUTPATIENT
Start: 2018-12-21 | End: 2018-12-21

## 2018-12-21 RX ORDER — LIDOCAINE HCL/PF 100 MG/5ML
SYRINGE (ML) INTRAVENOUS
Status: DISCONTINUED | OUTPATIENT
Start: 2018-12-21 | End: 2018-12-21

## 2018-12-21 RX ORDER — BUPIVACAINE HYDROCHLORIDE 5 MG/ML
INJECTION, SOLUTION EPIDURAL; INTRACAUDAL
Status: DISPENSED
Start: 2018-12-21 | End: 2018-12-21

## 2018-12-21 RX ORDER — DEXAMETHASONE SODIUM PHOSPHATE 4 MG/ML
INJECTION, SOLUTION INTRA-ARTICULAR; INTRALESIONAL; INTRAMUSCULAR; INTRAVENOUS; SOFT TISSUE
Status: DISCONTINUED | OUTPATIENT
Start: 2018-12-21 | End: 2018-12-21

## 2018-12-21 RX ORDER — PROPOFOL 10 MG/ML
VIAL (ML) INTRAVENOUS
Status: DISCONTINUED | OUTPATIENT
Start: 2018-12-21 | End: 2018-12-21

## 2018-12-21 RX ORDER — CELECOXIB 200 MG/1
200 CAPSULE ORAL DAILY
Status: DISCONTINUED | OUTPATIENT
Start: 2018-12-22 | End: 2018-12-23 | Stop reason: HOSPADM

## 2018-12-21 RX ORDER — SODIUM CHLORIDE 0.9 % (FLUSH) 0.9 %
3 SYRINGE (ML) INJECTION
Status: DISCONTINUED | OUTPATIENT
Start: 2018-12-21 | End: 2018-12-21 | Stop reason: HOSPADM

## 2018-12-21 RX ORDER — POLYETHYLENE GLYCOL 3350 17 G/17G
17 POWDER, FOR SOLUTION ORAL DAILY
Status: DISCONTINUED | OUTPATIENT
Start: 2018-12-22 | End: 2018-12-23 | Stop reason: HOSPADM

## 2018-12-21 RX ORDER — ROPIVACAINE HYDROCHLORIDE 7.5 MG/ML
INJECTION, SOLUTION EPIDURAL; PERINEURAL
Status: COMPLETED | OUTPATIENT
Start: 2018-12-21 | End: 2018-12-21

## 2018-12-21 RX ORDER — PHENYLEPHRINE HYDROCHLORIDE 10 MG/ML
INJECTION INTRAVENOUS
Status: DISCONTINUED | OUTPATIENT
Start: 2018-12-21 | End: 2018-12-21

## 2018-12-21 RX ORDER — FENTANYL CITRATE 50 UG/ML
25 INJECTION, SOLUTION INTRAMUSCULAR; INTRAVENOUS EVERY 5 MIN PRN
Status: COMPLETED | OUTPATIENT
Start: 2018-12-21 | End: 2018-12-21

## 2018-12-21 RX ORDER — ONDANSETRON 2 MG/ML
4 INJECTION INTRAMUSCULAR; INTRAVENOUS EVERY 8 HOURS PRN
Status: DISCONTINUED | OUTPATIENT
Start: 2018-12-21 | End: 2018-12-23 | Stop reason: HOSPADM

## 2018-12-21 RX ORDER — ROCURONIUM BROMIDE 10 MG/ML
INJECTION, SOLUTION INTRAVENOUS
Status: DISCONTINUED | OUTPATIENT
Start: 2018-12-21 | End: 2018-12-21

## 2018-12-21 RX ORDER — ACETAMINOPHEN 10 MG/ML
1000 INJECTION, SOLUTION INTRAVENOUS
Status: COMPLETED | OUTPATIENT
Start: 2018-12-21 | End: 2018-12-21

## 2018-12-21 RX ORDER — MIDAZOLAM HYDROCHLORIDE 1 MG/ML
0.5 INJECTION INTRAMUSCULAR; INTRAVENOUS
Status: DISCONTINUED | OUTPATIENT
Start: 2018-12-21 | End: 2018-12-21 | Stop reason: HOSPADM

## 2018-12-21 RX ADMIN — CELECOXIB 400 MG: 200 CAPSULE ORAL at 12:12

## 2018-12-21 RX ADMIN — Medication 10 MG: at 03:12

## 2018-12-21 RX ADMIN — LIDOCAINE HYDROCHLORIDE 0.03 MG/KG/MIN: 8 INJECTION, SOLUTION INTRAVENOUS at 01:12

## 2018-12-21 RX ADMIN — FENTANYL CITRATE 25 MCG: 50 INJECTION INTRAMUSCULAR; INTRAVENOUS at 08:12

## 2018-12-21 RX ADMIN — Medication 30 MG: at 02:12

## 2018-12-21 RX ADMIN — NEOSTIGMINE METHYLSULFATE 4 MG: 1 INJECTION INTRAVENOUS at 06:12

## 2018-12-21 RX ADMIN — LIDOCAINE HYDROCHLORIDE 100 MG: 20 INJECTION, SOLUTION INTRAVENOUS at 01:12

## 2018-12-21 RX ADMIN — MIDAZOLAM HYDROCHLORIDE 2 MG: 2 INJECTION, SOLUTION INTRAMUSCULAR; INTRAVENOUS at 12:12

## 2018-12-21 RX ADMIN — ROCURONIUM BROMIDE 50 MG: 10 INJECTION, SOLUTION INTRAVENOUS at 01:12

## 2018-12-21 RX ADMIN — SODIUM CHLORIDE, SODIUM GLUCONATE, SODIUM ACETATE, POTASSIUM CHLORIDE, MAGNESIUM CHLORIDE, SODIUM PHOSPHATE, DIBASIC, AND POTASSIUM PHOSPHATE: .53; .5; .37; .037; .03; .012; .00082 INJECTION, SOLUTION INTRAVENOUS at 05:12

## 2018-12-21 RX ADMIN — CEFAZOLIN 2 G: 330 INJECTION, POWDER, FOR SOLUTION INTRAMUSCULAR; INTRAVENOUS at 02:12

## 2018-12-21 RX ADMIN — PROPOFOL 150 MG: 10 INJECTION, EMULSION INTRAVENOUS at 01:12

## 2018-12-21 RX ADMIN — SODIUM CHLORIDE, SODIUM GLUCONATE, SODIUM ACETATE, POTASSIUM CHLORIDE, MAGNESIUM CHLORIDE, SODIUM PHOSPHATE, DIBASIC, AND POTASSIUM PHOSPHATE: .53; .5; .37; .037; .03; .012; .00082 INJECTION, SOLUTION INTRAVENOUS at 01:12

## 2018-12-21 RX ADMIN — FENTANYL CITRATE 100 MCG: 50 INJECTION, SOLUTION INTRAMUSCULAR; INTRAVENOUS at 01:12

## 2018-12-21 RX ADMIN — ROCURONIUM BROMIDE 20 MG: 10 INJECTION, SOLUTION INTRAVENOUS at 02:12

## 2018-12-21 RX ADMIN — DEXMEDETOMIDINE HYDROCHLORIDE 0.5 MCG/KG/HR: 100 INJECTION, SOLUTION, CONCENTRATE INTRAVENOUS at 01:12

## 2018-12-21 RX ADMIN — MIDAZOLAM HYDROCHLORIDE 1 MG: 1 INJECTION, SOLUTION INTRAMUSCULAR; INTRAVENOUS at 05:12

## 2018-12-21 RX ADMIN — FENTANYL CITRATE 25 MCG: 50 INJECTION INTRAMUSCULAR; INTRAVENOUS at 07:12

## 2018-12-21 RX ADMIN — PROPOFOL 50 MG: 10 INJECTION, EMULSION INTRAVENOUS at 05:12

## 2018-12-21 RX ADMIN — ROCURONIUM BROMIDE 20 MG: 10 INJECTION, SOLUTION INTRAVENOUS at 04:12

## 2018-12-21 RX ADMIN — GLYCOPYRROLATE 0.6 MG: 0.2 INJECTION, SOLUTION INTRAMUSCULAR; INTRAVENOUS at 06:12

## 2018-12-21 RX ADMIN — ACETAMINOPHEN 1000 MG: 10 INJECTION, SOLUTION INTRAVENOUS at 11:12

## 2018-12-21 RX ADMIN — MIDAZOLAM HYDROCHLORIDE 2 MG: 1 INJECTION, SOLUTION INTRAMUSCULAR; INTRAVENOUS at 01:12

## 2018-12-21 RX ADMIN — FENTANYL CITRATE 100 MCG: 50 INJECTION INTRAMUSCULAR; INTRAVENOUS at 12:12

## 2018-12-21 RX ADMIN — DEXAMETHASONE SODIUM PHOSPHATE 8 MG: 4 INJECTION, SOLUTION INTRAMUSCULAR; INTRAVENOUS at 01:12

## 2018-12-21 RX ADMIN — GLYCOPYRROLATE 0.2 MG: 0.2 INJECTION, SOLUTION INTRAMUSCULAR; INTRAVENOUS at 01:12

## 2018-12-21 RX ADMIN — CEFAZOLIN 2 G: 330 INJECTION, POWDER, FOR SOLUTION INTRAMUSCULAR; INTRAVENOUS at 05:12

## 2018-12-21 RX ADMIN — ROCURONIUM BROMIDE 20 MG: 10 INJECTION, SOLUTION INTRAVENOUS at 03:12

## 2018-12-21 RX ADMIN — PREGABALIN 150 MG: 150 CAPSULE ORAL at 12:12

## 2018-12-21 RX ADMIN — ROPIVACAINE HYDROCHLORIDE 15 ML: 7.5 INJECTION, SOLUTION EPIDURAL; PERINEURAL at 12:12

## 2018-12-21 RX ADMIN — PHENYLEPHRINE HYDROCHLORIDE 80 MCG: 10 INJECTION INTRAVENOUS at 05:12

## 2018-12-21 RX ADMIN — ONDANSETRON 4 MG: 2 INJECTION INTRAMUSCULAR; INTRAVENOUS at 05:12

## 2018-12-21 RX ADMIN — MIDAZOLAM HYDROCHLORIDE 1 MG: 1 INJECTION, SOLUTION INTRAMUSCULAR; INTRAVENOUS at 03:12

## 2018-12-21 RX ADMIN — SODIUM CHLORIDE: 0.9 INJECTION, SOLUTION INTRAVENOUS at 01:12

## 2018-12-21 RX ADMIN — SODIUM CHLORIDE 0.15 MCG/KG/MIN: 9 INJECTION, SOLUTION INTRAVENOUS at 01:12

## 2018-12-21 NOTE — ANESTHESIA PROCEDURE NOTES
Arterial    Diagnosis: Renal cell    Patient location during procedure: done in OR  Procedure start time: 12/21/2018 1:35 PM  Timeout: 12/21/2018 1:35 PM  Procedure end time: 12/21/2018 1:58 PM  Staffing  Anesthesiologist: Segun Valerio MD  Performed: anesthesiologist   Anesthesiologist was present at the time of the procedure.  Preanesthetic Checklist  Completed: patient identified, site marked, surgical consent, pre-op evaluation, timeout performed, IV checked, risks and benefits discussed, monitors and equipment checked and anesthesia consent givenArterial  Skin Prep: chlorhexidine gluconate  Local Infiltration: none  Orientation: right  Location: radial  Catheter Size: 20 G  Catheter placement by Anatomical landmarks. Heme positive aspiration all ports.Insertion Attempts: 2  Assessment  Dressing: secured with tape and tegaderm and secured with tape

## 2018-12-21 NOTE — ANESTHESIA PROCEDURE NOTES
L LETA SS    Patient location during procedure: pre-op   Block not for primary anesthetic.  Reason for block: at surgeon's request and post-op pain management   Post-op Pain Location: L abodominal pain  Start time: 12/21/2018 12:13 PM  Timeout: 12/21/2018 12:10 PM   End time: 12/21/2018 12:17 PM  Staffing  Anesthesiologist: Steven Clarke MD  Performed: anesthesiologist   Preanesthetic Checklist  Completed: patient identified, site marked, surgical consent, pre-op evaluation, timeout performed, IV checked, risks and benefits discussed and monitors and equipment checked  Peripheral Block  Patient position: sitting  Prep: ChloraPrep  Patient monitoring: heart rate, cardiac monitor, continuous pulse ox, continuous capnometry and frequent blood pressure checks  Block type: erector spinae plane (Erector Spinae Plane Block)  Laterality: left  Injection technique: single shot  Needle  Needle type: Tuohy   Needle gauge: 17 G  Needle length: 3.5 in  Needle localization: anatomical landmarks and ultrasound guidance   -ultrasound image captured on disc.  Assessment  Injection assessment: negative aspiration, negative parasthesia and local visualized surrounding nerve  Paresthesia pain: none  Heart rate change: no  Slow fractionated injection: yes  Additional Notes  Patient tolerated well.  [facility]

## 2018-12-21 NOTE — OP NOTE
[facility]  Operative Note    Date: 12/21/2018    Pre-Op Diagnosis: left renal mass suspicious for renal cell carcinoma  Patient Active Problem List    Diagnosis Date Noted    Renal mass 12/21/2018    Left renal mass 11/12/2018    Pelvic pain in female 10/23/2018    Menorrhagia with irregular cycle 06/13/2018    Dysfunctional uterine bleeding 06/11/2018    GERD (gastroesophageal reflux disease) 04/03/2017    Migraine without aura and without status migrainosus, not intractable 11/28/2016    Anemia 10/30/2015         Post-Op Diagnosis: same    Procedure(s) Performed:   1.  Robotic left partial nephrectomy  2.  Intraoperative US for localization of renal tumor    Specimen(s):   1.  Left renal mass      Surgeon: Justin Vo MD    Assistant: Troy Roy MD     Bedside assistant Mauricio Gleason (no qualified resident available for bedside assistant)    Anesthesia: General endotracheal anesthesia    Indications: Nolvia Garcia is a 43 y.o. female with left renal mass suspicious for renal cell carcinoma.  After discussion of management options and risks and benefits associated with each the patient has elected to pursue surgical management.      Findings:   - Two renal arteries and one vein  - Posterior mass excised and good hemostasis achieved  - warm ischemia time 28 minutes    EBL: 200 mL    Drains:   1.  16 Fr sung catheter    Anesthesia: General endotracheal anesthesia    Complications:  none    Procedure in Detail: After discussion of risks and benefits of the procedure, informed consent was obtained.  The patient was brought to the operating room and placed supine on the operating table.  SCDs were applied and working prior to induction of anesthesia.  General anesthesia was administered.  A 16 Fr Sung catheter was placed in the standard fashion with 10 ml sterile water used to inflate the balloon.  An OG tube was placed.  The patient was then moved into the modified flank  position with the left side up. The patient was appropriately padded and secured to the table.  He was then prepped and draped in the usual sterile fashion.  Timeout was performed and preoperative antibiotics were confirmed.      A Veress needle was introduced into the abdomen.  Entry into the peritoneal cavity was confirmed with the drop test and an initial insufflation pressure of <10mmHg.  Aspiration during our drop test revealed no blood or succus.  The peritoneal cavity was insufflated up to 15 mmHg and the Veress was removed.  The location of the 12 mm camera port was marked with a marking pen and incised sharply using a 15 blade.  The 12 mm port was then introduced.  The camera was passed through the port and the abdomen was inspected and found to be free of bowel or vascular injury.  Using direct vision the other 2 robotic ports were placed, just below the left costal margin and lower on the left abdomen, ensuring at least 8 cm between ports to allow robotic mobility.  A 12 mm assistant port was placed in the upper midline and a 5 mm assistant port was placed in the lower midline.  Each trocar was introduced under direct vision ensuring no injury to the underlying abdominal contents.      Dissection began along the white line of Toldt, reflecting the colon medially away from the kidney. The splenic reflection was released.  The psoas muscle was identified. Once the colon was reflected, dissection was carried out just below the kidney, and the ureter was identified.  The ureter was elevated and we continued our dissection toward the lower pole of the kidney proximally until we identified the renal hilum.  The ureter remained away from our dissection throughout the case.      Careful dissection of the hilum was performed.  The renal vein was easily identified anteriorly.  The renal arteries were identified posterior and inferior to the renal vein.  The arteries were isolated circumferentially.  Hemostasis was  excellent.      The fat overlying the kidney was opened and freed from the underlying capsule along the kidney's length. The entire kidney was mobilized and flipped. This revealed the mid pole tumor.  The kidney was freed from its surrounding attachments to allow adequate mobility to visualize the entire mid pole.  The US probe was used to delineate the margins of the kidney, which were scored using hot scissors to ensure the entire tumor was identified.      Two bulldog clamps were placed on the artery.  The tumor was then excised using cold scissors.  Margins were grossly negative, as confirmed with backbench visualization.  There was entry into the collecting system.  The tumor was passed into the LUQ.      A renorrhaphy was performed using 2-0 vicryl to close the deep resection bed.  The capsule was then closed using 0 vicryl.  The bulldog clamp was released for a total warm ischemia time of 28 minutes.  Hemostasis of the renorrhaphy was excellent and the kidney was noted to have good color and turgor.  The ureter was again noted to be well away from the resection site and clamped renal artery.  FloSeal was placed into the renorrhaphy bed.  Gerota's was reapproximated over the kidney using 0 vicryl.      The tumor was placed into an EndoCatch bag via the 12mm upper midline assistant port. This was placed under direct vision.      The robot was undocked and all trocars were removed.  The 12 mm upper midline incision was extended from skin down to fascia to allow removal of the specimen.  This was inspected and found to be in tact.  This was passed off the field for pathologic analysis.      The extraction site fascia was closed using 1-0 PDS in a figure of 8 fashion.  All skin incisions were closed using 4-0 monocryl. Dermabond was applied to the incisions.     The patient tolerated the procedure well and was transferred to the recovery room in stable condition.    Disposition:  The patient will be admitted to  the Urology service for post-operative monitoring, pain control, and observation due to risk of bleeding post-operatively.    Troy Roy MD

## 2018-12-21 NOTE — H&P
[facility]  History & Physical     Subjective:     Chief Complaint/Reason for Admission: left renal mass    History of Present Illness:   Patient 43 y.o. female presents with a left renal mass here for partial nephrectomy.    Patient Active Problem List    Diagnosis Date Noted    Renal mass 12/21/2018    Left renal mass 11/12/2018    Pelvic pain in female 10/23/2018    Menorrhagia with irregular cycle 06/13/2018    Dysfunctional uterine bleeding 06/11/2018    GERD (gastroesophageal reflux disease) 04/03/2017    Migraine without aura and without status migrainosus, not intractable 11/28/2016    Anemia 10/30/2015        Medications Prior to Admission   Medication Sig Dispense Refill Last Dose    HYDROcodone-acetaminophen (NORCO) 5-325 mg per tablet Take 1 tablet by mouth every 6 (six) hours as needed for Pain. 10 tablet 0 More than a month at Unknown time    HYDROcodone-acetaminophen (NORCO) 5-325 mg per tablet Take 1 tablet by mouth every 6 (six) hours as needed for pain. 15 tablet 0 More than a month at Unknown time    ibuprofen (ADVIL,MOTRIN) 800 MG tablet Take 1 tablet (800 mg total) by mouth every 8 (eight) hours as needed (pain/cramping). 30 tablet 0 More than a month at Unknown time    sumatriptan (IMITREX) 100 MG tablet Take one tablet at onset of headache. May repeat in 2 hours if needed. Max of 2 tabs in 24 hours 18 tablet 1 More than a month at Unknown time    topiramate (TOPAMAX) 50 MG tablet One daily for headache prevention (Patient taking differently: Take 50 mg by mouth once daily. One daily for headache prevention) 30 tablet 1 More than a month at Unknown time     Review of patient's allergies indicates:  No Known Allergies     Past Medical History:   Diagnosis Date    Abnormal Pap smear of cervix     Anxiety     GERD (gastroesophageal reflux disease)     Migraines       Past Surgical History:   Procedure Laterality Date    ABLATION, ENDOMETRIUM, THERMAL N/A 6/13/2018     [facility]    APPENDECTOMY      CYSTOSCOPY N/A 10/23/2018    [facility]    DILATION AND CURETTAGE, UTERUS N/A 6/13/2018    [facility]    HYSTERECTOMY, TOTAL, LAPAROSCOPIC N/A 10/23/2018    [facility] OR    OOPHORECTOMY, LAPAROSCOPIC Right 10/23/2018    [facility] OR    SALPINGECTOMY, LAPAROSCOPIC Bilateral 10/23/2018    [facility] at [facility] OR    TUBAL LIGATION        Family History   Problem Relation Age of Onset    No Known Problems Mother     No Known Problems Father     Breast cancer Maternal Grandmother     Colon cancer Neg Hx     Ovarian cancer Neg Hx       Social History     Tobacco Use    Smoking status: Never Smoker    Smokeless tobacco: Never Used   Substance Use Topics    Alcohol use: Yes     Alcohol/week: 0.0 oz     Comment: rare        Review of Systems  All other systems reviewed and negative except pertinent positives noted in HPI.      Physical Exam   Constitutional: She is oriented to person, place, and time. She appears well-developed and well-nourished.   HENT:   Head: Normocephalic.   Eyes: EOM are normal.   Cardiovascular: Normal rate, intact distal pulses and normal pulses.   Pulmonary/Chest: No accessory muscle usage. No tachypnea. No respiratory distress.   Abdominal: Soft. Normal appearance. She exhibits no distension, no fluid wave, no ascites and no mass. There is no tenderness. There is no rebound, no guarding and no CVA tenderness. No hernia.   Musculoskeletal: Normal range of motion.   Neurological: She is alert and oriented to person, place, and time.   Skin: No bruising and no rash noted.   Psychiatric: She has a normal mood and affect. Her speech is normal and behavior is normal. Thought content normal.         OBJECTIVE:     Patient Vitals for the past 8 hrs:   BP Temp Temp src Pulse Resp SpO2 Height  "Weight   12/21/18 1059 126/83 98.6 °F (37 °C) Oral 86 16 100 % 4' 11" (1.499 m) 83 kg (183 lb)         Data Review:  CBC: No results for input(s): WBC, RBC, HGB, HCT, PLT, MCV, MCH, MCHC in the last 168 hours.  BMP: No results for input(s): GLU, NA, K, CL, CO2, BUN, CREATININE, CALCIUM, MG in the last 168 hours.  Coagulation: No results for input(s): LABPROT, INR, APTT in the last 168 hours.  Microbiology Results (last 7 days)     ** No results found for the last 168 hours. **        No results for input(s): COLORU, CLARITYU, SPECGRAV, PHUR, PROTEINUA, GLUCOSEU, BILIRUBINCON, BLOODU, WBCU, RBCU, BACTERIA, MUCUS, NITRITE, LEUKOCYTESUR, UROBILINOGEN, HYALINECASTS in the last 168 hours.    ASSESSMENT/PLAN:     Active Hospital Problems    Diagnosis  POA    Renal mass [N28.89]  Yes      Resolved Hospital Problems   No resolved problems to display.     Patient Active Problem List    Diagnosis Date Noted    Renal mass 12/21/2018    Left renal mass 11/12/2018    Pelvic pain in female 10/23/2018    Menorrhagia with irregular cycle 06/13/2018    Dysfunctional uterine bleeding 06/11/2018    GERD (gastroesophageal reflux disease) 04/03/2017    Migraine without aura and without status migrainosus, not intractable 11/28/2016    Anemia 10/30/2015         Plan:  I have explained the risk, benefits, and alternatives of the procedure in detail.  The risks including but not limited to bleeding, pseudoaneurysm, AVM, injury to adjacent structures including the spleen, liver, lung, pancreas, colon and intestines, blood vessels in the abdomen including the renal artery or renal vein, ureter, loss of kidney, urinoma or urinary fistula, or need for conversion to open or radical nephrectomy were explained to the patient in depth. The patient voices understanding and all questions have been answered. The patient agrees to proceed as planned with a left robotic partial nephrectomy.      "

## 2018-12-21 NOTE — PLAN OF CARE
Rotbot time out completed with pre incision time out.  All DaVinci instruments inspected before case by Carolyn Mancera.  All instruments appear to be intact.

## 2018-12-22 LAB
ANION GAP SERPL CALC-SCNC: 6 MMOL/L
BASOPHILS # BLD AUTO: 0.02 K/UL
BASOPHILS NFR BLD: 0.1 %
BUN SERPL-MCNC: 11 MG/DL
CALCIUM SERPL-MCNC: 8.2 MG/DL
CHLORIDE SERPL-SCNC: 105 MMOL/L
CO2 SERPL-SCNC: 26 MMOL/L
CREAT SERPL-MCNC: 0.9 MG/DL
DIFFERENTIAL METHOD: ABNORMAL
EOSINOPHIL # BLD AUTO: 0 K/UL
EOSINOPHIL NFR BLD: 0 %
ERYTHROCYTE [DISTWIDTH] IN BLOOD BY AUTOMATED COUNT: 13.4 %
EST. GFR  (AFRICAN AMERICAN): >60 ML/MIN/1.73 M^2
EST. GFR  (NON AFRICAN AMERICAN): >60 ML/MIN/1.73 M^2
GLUCOSE SERPL-MCNC: 141 MG/DL
HCT VFR BLD AUTO: 28.6 %
HGB BLD-MCNC: 9.4 G/DL
IMM GRANULOCYTES # BLD AUTO: 0.05 K/UL
IMM GRANULOCYTES NFR BLD AUTO: 0.4 %
LYMPHOCYTES # BLD AUTO: 0.8 K/UL
LYMPHOCYTES NFR BLD: 5.6 %
MAGNESIUM SERPL-MCNC: 2.2 MG/DL
MCH RBC QN AUTO: 28.1 PG
MCHC RBC AUTO-ENTMCNC: 32.9 G/DL
MCV RBC AUTO: 85 FL
MONOCYTES # BLD AUTO: 0.8 K/UL
MONOCYTES NFR BLD: 5.6 %
NEUTROPHILS # BLD AUTO: 12.5 K/UL
NEUTROPHILS NFR BLD: 88.3 %
NRBC BLD-RTO: 0 /100 WBC
PHOSPHATE SERPL-MCNC: 2.4 MG/DL
PLATELET # BLD AUTO: 352 K/UL
PMV BLD AUTO: 8.7 FL
POTASSIUM SERPL-SCNC: 4.2 MMOL/L
RBC # BLD AUTO: 3.35 M/UL
SODIUM SERPL-SCNC: 137 MMOL/L
WBC # BLD AUTO: 14.1 K/UL

## 2018-12-22 PROCEDURE — 11000001 HC ACUTE MED/SURG PRIVATE ROOM

## 2018-12-22 PROCEDURE — 25000003 PHARM REV CODE 250: Performed by: STUDENT IN AN ORGANIZED HEALTH CARE EDUCATION/TRAINING PROGRAM

## 2018-12-22 PROCEDURE — 25000003 PHARM REV CODE 250: Performed by: UROLOGY

## 2018-12-22 PROCEDURE — 63600175 PHARM REV CODE 636 W HCPCS: Performed by: STUDENT IN AN ORGANIZED HEALTH CARE EDUCATION/TRAINING PROGRAM

## 2018-12-22 PROCEDURE — 85025 COMPLETE CBC W/AUTO DIFF WBC: CPT

## 2018-12-22 PROCEDURE — 84100 ASSAY OF PHOSPHORUS: CPT

## 2018-12-22 PROCEDURE — 83735 ASSAY OF MAGNESIUM: CPT

## 2018-12-22 PROCEDURE — 36415 COLL VENOUS BLD VENIPUNCTURE: CPT

## 2018-12-22 PROCEDURE — 80048 BASIC METABOLIC PNL TOTAL CA: CPT

## 2018-12-22 RX ORDER — OXYCODONE HYDROCHLORIDE 5 MG/1
5 TABLET ORAL EVERY 4 HOURS PRN
Status: DISCONTINUED | OUTPATIENT
Start: 2018-12-22 | End: 2018-12-23 | Stop reason: HOSPADM

## 2018-12-22 RX ORDER — OXYCODONE HYDROCHLORIDE 10 MG/1
10 TABLET ORAL EVERY 4 HOURS PRN
Status: DISCONTINUED | OUTPATIENT
Start: 2018-12-22 | End: 2018-12-23 | Stop reason: HOSPADM

## 2018-12-22 RX ORDER — POLYETHYLENE GLYCOL 3350 17 G/17G
17 POWDER, FOR SOLUTION ORAL DAILY
Qty: 30 PACKET | Refills: 0 | Status: SHIPPED | OUTPATIENT
Start: 2018-12-22 | End: 2019-07-22

## 2018-12-22 RX ORDER — METHOCARBAMOL 500 MG/1
1000 TABLET, FILM COATED ORAL 4 TIMES DAILY
Status: DISCONTINUED | OUTPATIENT
Start: 2018-12-22 | End: 2018-12-23 | Stop reason: HOSPADM

## 2018-12-22 RX ORDER — OXYCODONE AND ACETAMINOPHEN 5; 325 MG/1; MG/1
1 TABLET ORAL EVERY 4 HOURS PRN
Qty: 11 TABLET | Refills: 0 | Status: SHIPPED | OUTPATIENT
Start: 2018-12-22 | End: 2019-07-22

## 2018-12-22 RX ORDER — ACETAMINOPHEN 10 MG/ML
1000 INJECTION, SOLUTION INTRAVENOUS
Status: COMPLETED | OUTPATIENT
Start: 2018-12-22 | End: 2018-12-22

## 2018-12-22 RX ADMIN — ACETAMINOPHEN 1000 MG: 500 TABLET ORAL at 02:12

## 2018-12-22 RX ADMIN — ACETAMINOPHEN 1000 MG: 500 TABLET ORAL at 05:12

## 2018-12-22 RX ADMIN — ACETAMINOPHEN 1000 MG: 10 INJECTION, SOLUTION INTRAVENOUS at 09:12

## 2018-12-22 RX ADMIN — OXYCODONE HYDROCHLORIDE 10 MG: 10 TABLET ORAL at 09:12

## 2018-12-22 RX ADMIN — METHOCARBAMOL TABLETS 1000 MG: 500 TABLET, COATED ORAL at 09:12

## 2018-12-22 RX ADMIN — ACETAMINOPHEN 1000 MG: 500 TABLET ORAL at 12:12

## 2018-12-22 RX ADMIN — OXYCODONE HYDROCHLORIDE 10 MG: 10 TABLET ORAL at 08:12

## 2018-12-22 RX ADMIN — PREGABALIN 75 MG: 75 CAPSULE ORAL at 12:12

## 2018-12-22 RX ADMIN — OXYCODONE HYDROCHLORIDE 10 MG: 10 TABLET ORAL at 02:12

## 2018-12-22 RX ADMIN — POLYETHYLENE GLYCOL 3350 17 G: 17 POWDER, FOR SOLUTION ORAL at 08:12

## 2018-12-22 RX ADMIN — ACETAMINOPHEN 1000 MG: 500 TABLET ORAL at 06:12

## 2018-12-22 RX ADMIN — METHOCARBAMOL TABLETS 1000 MG: 500 TABLET, COATED ORAL at 05:12

## 2018-12-22 RX ADMIN — OXYCODONE HYDROCHLORIDE 10 MG: 10 TABLET ORAL at 12:12

## 2018-12-22 RX ADMIN — CELECOXIB 200 MG: 200 CAPSULE ORAL at 08:12

## 2018-12-22 RX ADMIN — OXYCODONE HYDROCHLORIDE 10 MG: 10 TABLET ORAL at 04:12

## 2018-12-22 RX ADMIN — PREGABALIN 75 MG: 75 CAPSULE ORAL at 09:12

## 2018-12-22 RX ADMIN — PANTOPRAZOLE SODIUM 40 MG: 40 TABLET, DELAYED RELEASE ORAL at 08:12

## 2018-12-22 NOTE — SUBJECTIVE & OBJECTIVE
Interval History:   No acute events overnight  Complaining of pain this am and overnight  No nausea or vomiting  Ambulating well  Tolerating diet    Review of Systems  Objective:     Temp:  [97.2 °F (36.2 °C)-98.6 °F (37 °C)] 98.2 °F (36.8 °C)  Pulse:  [58-96] 85  Resp:  [12-21] 18  SpO2:  [91 %-100 %] 94 %  BP: ()/(50-83) 121/63     Body mass index is 36.96 kg/m².            Drains     Drain                 Urethral Catheter 12/21/18 1329 Non-latex;Straight-tip 16 Fr. less than 1 day                Physical Exam   Constitutional: She is oriented to person, place, and time. No distress.   HENT:   Head: Normocephalic and atraumatic.   Eyes: Conjunctivae are normal. No scleral icterus.   Neck: Normal range of motion. No JVD present.   Cardiovascular: Normal rate and regular rhythm.    Pulmonary/Chest: Effort normal. No respiratory distress.   Abdominal: Soft. She exhibits no distension. There is tenderness (appropriate). There is no rebound and no guarding.   Incisions c/d/i  16 Fr sung draining clear yellow   Musculoskeletal: Normal range of motion.   SCDs in place   Neurological: She is alert and oriented to person, place, and time.   Skin: Skin is warm and dry. She is not diaphoretic. No pallor.     Psychiatric: She has a normal mood and affect. Her behavior is normal.       Significant Labs:    BMP:  Recent Labs   Lab 12/22/18  0505      K 4.2      CO2 26   BUN 11   CREATININE 0.9   CALCIUM 8.2*       CBC:   Recent Labs   Lab 12/22/18  0505   WBC 14.10*   HGB 9.4*   HCT 28.6*   *       All pertinent labs results from the past 24 hours have been reviewed.    Significant Imaging:  All pertinent imaging results/findings from the past 24 hours have been reviewed.

## 2018-12-22 NOTE — ANESTHESIA RELEASE NOTE
"Anesthesia Release from PACU Note    Patient: Nolvia Garcia    Procedure(s) Performed: Procedure(s) (LRB):  ROBOTIC NEPHRECTOMY, PARTIAL (Left)    Anesthesia type: general    Post pain: Adequate analgesia    Post assessment: no apparent anesthetic complications, tolerated procedure well and no evidence of recall    Last Vitals:   Visit Vitals  BP 96/62   Pulse 62   Temp 36.9 °C (98.5 °F) (Temporal)   Resp 17   Ht 4' 11" (1.499 m)   Wt 83 kg (183 lb)   LMP 09/11/2018   SpO2 100%   Breastfeeding? No   BMI 36.96 kg/m²       Post vital signs: stable    Level of consciousness: awake, alert  and oriented    Nausea/Vomiting: no nausea/no vomiting    Complications: none    Airway Patency: patent    Respiratory: unassisted    Cardiovascular: stable and blood pressure at baseline    Hydration: euvolemic  "

## 2018-12-22 NOTE — TRANSFER OF CARE
"Anesthesia Transfer of Care Note    Patient: Nolvia Garcia    Procedure(s) Performed: Procedure(s) (LRB):  ROBOTIC NEPHRECTOMY, PARTIAL (Left)    Patient location: PACU    Anesthesia Type: general    Transport from OR: Transported from OR on 6-10 L/min O2 by face mask with adequate spontaneous ventilation    Post pain: adequate analgesia    Post assessment: tolerated procedure well and no apparent anesthetic complications    Post vital signs: stable    Level of consciousness: sedated    Nausea/Vomiting: no nausea/vomiting    Complications: none    Transfer of care protocol was followed      Last vitals:   Visit Vitals  BP (!) 111/57 (BP Location: Right arm, Patient Position: Lying)   Pulse (!) 58   Temp 37 °C (98.6 °F) (Oral)   Resp 16   Ht 4' 11" (1.499 m)   Wt 83 kg (183 lb)   LMP 09/11/2018   SpO2 98%   Breastfeeding? No   BMI 36.96 kg/m²     "

## 2018-12-22 NOTE — NURSING TRANSFER
Nursing Transfer Note      12/21/2018     Transfer 504 A    Transfer via bed    Transfer with glasses, chapstick    Transported by x 2 transport    Medicines sent: none    Chart send with patient: yes    Notified: Jacki, Rn    Patient reassessed at: upon arrival to the unit

## 2018-12-22 NOTE — ASSESSMENT & PLAN NOTE
Nolvia Garcia is a 43 y.o. female s/p left partial nephrectomy on 12/21/18    - Regular diet  - SLIV  - Ambulate/oob  - JARROD/SCDs  - Remove sung this am  - Voiding trial  - Pain and nausea control  - Labs stable  - Home meds restarted    Possible dc this pm if pain better controlled

## 2018-12-22 NOTE — PLAN OF CARE
POC reviewed with pt, acknowledged understanding. Pt AAO x 4. Pt remains free of falls/injuries. Pt on telemetry remains SR. Pt tolerating clear liquid diet. Pt pain controlled with prescribed meds. Pt voids per catheter-output recorded. Pt abdominal incsions to odalisondBrandy acute events throughout shift. No distress noted, will continue to monitor.

## 2018-12-22 NOTE — PROGRESS NOTES
[facility]  Urology  Progress Note    Patient Name: Nolvia Garcia  MRN: [id number]  Admission Date: 12/21/2018  Hospital Length of Stay: 1 days  Code Status: Prior   Attending Provider: Justin Vo MD   Primary Care Physician: Ten Hunt MD    Subjective:     HPI:  Nolvia Garcia is a 43 y.o. female s/p left partial nephrectomy on 12/21/18    Interval History:   No acute events overnight  Complaining of pain this am and overnight  No nausea or vomiting  Ambulating well  Tolerating diet    Review of Systems  Objective:     Temp:  [97.2 °F (36.2 °C)-98.6 °F (37 °C)] 98.2 °F (36.8 °C)  Pulse:  [58-96] 85  Resp:  [12-21] 18  SpO2:  [91 %-100 %] 94 %  BP: ()/(50-83) 121/63     Body mass index is 36.96 kg/m².            Drains     Drain                 Urethral Catheter 12/21/18 1329 Non-latex;Straight-tip 16 Fr. less than 1 day                Physical Exam   Constitutional: She is oriented to person, place, and time. No distress.   HENT:   Head: Normocephalic and atraumatic.   Eyes: Conjunctivae are normal. No scleral icterus.   Neck: Normal range of motion. No JVD present.   Cardiovascular: Normal rate and regular rhythm.    Pulmonary/Chest: Effort normal. No respiratory distress.   Abdominal: Soft. She exhibits no distension. There is tenderness (appropriate). There is no rebound and no guarding.   Incisions c/d/i  16 Fr sung draining clear yellow   Musculoskeletal: Normal range of motion.   SCDs in place   Neurological: She is alert and oriented to person, place, and time.   Skin: Skin is warm and dry. She is not diaphoretic. No pallor.     Psychiatric: She has a normal mood and affect. Her behavior is normal.       Significant Labs:    BMP:  Recent Labs   Lab 12/22/18  0505      K 4.2      CO2 26   BUN 11   CREATININE 0.9   CALCIUM 8.2*       CBC:   Recent Labs   Lab 12/22/18  0505   WBC 14.10*   HGB 9.4*   HCT 28.6*   *       All pertinent labs  results from the past 24 hours have been reviewed.    Significant Imaging:  All pertinent imaging results/findings from the past 24 hours have been reviewed.      Assessment/Plan:     * Renal mass    Nolvia Garcia is a 43 y.o. female s/p left partial nephrectomy on 12/21/18    - Regular diet  - SLIV  - Ambulate/oob  - JRAROD/SCDs  - Remove sung this am  - Voiding trial  - Pain and nausea control  - Labs stable  - Home meds restarted    Possible dc this pm if pain better controlled         VTE Risk Mitigation (From admission, onward)        Ordered     IP VTE HIGH RISK PATIENT  Once      12/21/18 1751     Place sequential compression device  Until discontinued      12/21/18 1751     Place JARROD hose  Until discontinued      12/21/18 1751     Place JARROD hose  Until discontinued      12/21/18 1010     Place sequential compression device  Until discontinued      12/21/18 1010          Zohra Sexton MD  Urology  [facility]    I have reviewed the notes, assessments, and/or procedures performed by Dr. Sexton, I concur with her/his documentation of Nolvia Garcia.  Discharge later today vs tomorrow if no further issues.

## 2018-12-23 VITALS
HEART RATE: 74 BPM | DIASTOLIC BLOOD PRESSURE: 62 MMHG | BODY MASS INDEX: 36.89 KG/M2 | TEMPERATURE: 97 F | WEIGHT: 183 LBS | SYSTOLIC BLOOD PRESSURE: 113 MMHG | RESPIRATION RATE: 18 BRPM | HEIGHT: 59 IN | OXYGEN SATURATION: 95 %

## 2018-12-23 LAB
ANION GAP SERPL CALC-SCNC: 5 MMOL/L
BASOPHILS # BLD AUTO: 0.03 K/UL
BASOPHILS # BLD AUTO: 0.05 K/UL
BASOPHILS NFR BLD: 0.3 %
BASOPHILS NFR BLD: 0.5 %
BUN SERPL-MCNC: 13 MG/DL
CALCIUM SERPL-MCNC: 8.1 MG/DL
CHLORIDE SERPL-SCNC: 107 MMOL/L
CO2 SERPL-SCNC: 28 MMOL/L
CREAT SERPL-MCNC: 0.8 MG/DL
DIFFERENTIAL METHOD: ABNORMAL
DIFFERENTIAL METHOD: ABNORMAL
EOSINOPHIL # BLD AUTO: 0.1 K/UL
EOSINOPHIL # BLD AUTO: 0.1 K/UL
EOSINOPHIL NFR BLD: 0.5 %
EOSINOPHIL NFR BLD: 0.8 %
ERYTHROCYTE [DISTWIDTH] IN BLOOD BY AUTOMATED COUNT: 13.6 %
ERYTHROCYTE [DISTWIDTH] IN BLOOD BY AUTOMATED COUNT: 13.8 %
EST. GFR  (AFRICAN AMERICAN): >60 ML/MIN/1.73 M^2
EST. GFR  (NON AFRICAN AMERICAN): >60 ML/MIN/1.73 M^2
GLUCOSE SERPL-MCNC: 95 MG/DL
HCT VFR BLD AUTO: 24.8 %
HCT VFR BLD AUTO: 26.2 %
HGB BLD-MCNC: 8 G/DL
HGB BLD-MCNC: 8.3 G/DL
IMM GRANULOCYTES # BLD AUTO: 0.03 K/UL
IMM GRANULOCYTES # BLD AUTO: 0.05 K/UL
IMM GRANULOCYTES NFR BLD AUTO: 0.3 %
IMM GRANULOCYTES NFR BLD AUTO: 0.5 %
LYMPHOCYTES # BLD AUTO: 1.8 K/UL
LYMPHOCYTES # BLD AUTO: 2.2 K/UL
LYMPHOCYTES NFR BLD: 18.1 %
LYMPHOCYTES NFR BLD: 23.7 %
MAGNESIUM SERPL-MCNC: 1.9 MG/DL
MCH RBC QN AUTO: 27.4 PG
MCH RBC QN AUTO: 28 PG
MCHC RBC AUTO-ENTMCNC: 31.7 G/DL
MCHC RBC AUTO-ENTMCNC: 32.3 G/DL
MCV RBC AUTO: 87 FL
MCV RBC AUTO: 87 FL
MONOCYTES # BLD AUTO: 0.7 K/UL
MONOCYTES # BLD AUTO: 0.8 K/UL
MONOCYTES NFR BLD: 7.8 %
MONOCYTES NFR BLD: 7.9 %
NEUTROPHILS # BLD AUTO: 6.2 K/UL
NEUTROPHILS # BLD AUTO: 7.2 K/UL
NEUTROPHILS NFR BLD: 67.4 %
NEUTROPHILS NFR BLD: 72.2 %
NRBC BLD-RTO: 0 /100 WBC
NRBC BLD-RTO: 0 /100 WBC
PHOSPHATE SERPL-MCNC: 3.1 MG/DL
PLATELET # BLD AUTO: 250 K/UL
PLATELET # BLD AUTO: 270 K/UL
PMV BLD AUTO: 8.3 FL
PMV BLD AUTO: 8.6 FL
POTASSIUM SERPL-SCNC: 3.8 MMOL/L
RBC # BLD AUTO: 2.86 M/UL
RBC # BLD AUTO: 3.03 M/UL
SODIUM SERPL-SCNC: 140 MMOL/L
WBC # BLD AUTO: 9.18 K/UL
WBC # BLD AUTO: 9.94 K/UL

## 2018-12-23 PROCEDURE — 83735 ASSAY OF MAGNESIUM: CPT

## 2018-12-23 PROCEDURE — 85025 COMPLETE CBC W/AUTO DIFF WBC: CPT | Mod: 91

## 2018-12-23 PROCEDURE — 84100 ASSAY OF PHOSPHORUS: CPT

## 2018-12-23 PROCEDURE — 36415 COLL VENOUS BLD VENIPUNCTURE: CPT

## 2018-12-23 PROCEDURE — 25000003 PHARM REV CODE 250: Performed by: UROLOGY

## 2018-12-23 PROCEDURE — 25000003 PHARM REV CODE 250: Performed by: STUDENT IN AN ORGANIZED HEALTH CARE EDUCATION/TRAINING PROGRAM

## 2018-12-23 PROCEDURE — 80048 BASIC METABOLIC PNL TOTAL CA: CPT

## 2018-12-23 PROCEDURE — 63600175 PHARM REV CODE 636 W HCPCS: Performed by: STUDENT IN AN ORGANIZED HEALTH CARE EDUCATION/TRAINING PROGRAM

## 2018-12-23 RX ORDER — ACETAMINOPHEN 10 MG/ML
1000 INJECTION, SOLUTION INTRAVENOUS
Status: COMPLETED | OUTPATIENT
Start: 2018-12-23 | End: 2018-12-23

## 2018-12-23 RX ADMIN — PANTOPRAZOLE SODIUM 40 MG: 40 TABLET, DELAYED RELEASE ORAL at 09:12

## 2018-12-23 RX ADMIN — ACETAMINOPHEN 1000 MG: 10 INJECTION, SOLUTION INTRAVENOUS at 09:12

## 2018-12-23 RX ADMIN — OXYCODONE HYDROCHLORIDE 10 MG: 10 TABLET ORAL at 09:12

## 2018-12-23 RX ADMIN — CELECOXIB 200 MG: 200 CAPSULE ORAL at 09:12

## 2018-12-23 RX ADMIN — ACETAMINOPHEN 1000 MG: 500 TABLET ORAL at 01:12

## 2018-12-23 RX ADMIN — OXYCODONE HYDROCHLORIDE 10 MG: 10 TABLET ORAL at 01:12

## 2018-12-23 RX ADMIN — POLYETHYLENE GLYCOL 3350 17 G: 17 POWDER, FOR SOLUTION ORAL at 09:12

## 2018-12-23 RX ADMIN — METHOCARBAMOL TABLETS 1000 MG: 500 TABLET, COATED ORAL at 09:12

## 2018-12-23 RX ADMIN — METHOCARBAMOL TABLETS 1000 MG: 500 TABLET, COATED ORAL at 01:12

## 2018-12-23 NOTE — DISCHARGE INSTRUCTIONS
What to expect with your Partial Nephrectomy.  [facility]   When you go home:  o Blood pressure  - Your blood pressure must be well controlled (<140 systolic blood pressure), if youre blood pressure is not controlled, there is an increased risk of bleeding.  o Activity  - Continue to walk - small walks throughout the day are better than one long walk.   - Do not lift anything greater than 8 pounds for 6 weeks - we want your abdominal wall muscles to heal.  o Bowel Movements - Do not strain to have a bowel movement - the pain medicines will make you constipated. That is why we also ask you to take colace 2-3 x per day to help keep your bowels regular. If you are still having trouble, then you can also add Miralax once a day. Do not take any stool softeners if you are having diarrhea.  o Drain - If you have a drain (not your catheter, but a separate drain) then record the output and bring it with you to your next appointment  o Smoking - If you smoke, we encourage you to STOP. Smoking interferes with the healing process and your prolong your healing with continued smoking.  o Driving - Do not drive while you are on pain meds or with your catheter in place.  o Bathing - If you do not have a drain, you can shower 48 hours after your surgery. If you do have a drain, sponge bathe only until the drain is out.  o Dressing - you can remove the dressings if there is no drainage or change them as needed if there is. The little sterile band-aid strips will fall off on their own in 10-14 days. If they have not fallen off then you can remove them yourself.  o Restarting medicines -especially blood thinners (Aspirin, Plavix, Coumadin), Fish Oil. Discuss this with your physician prior to discharge.   When to return to the ER  o Fever - If you have a fever >101.5. This could be due to a number of reasons such as infection of the urine or incision. If your catheter has been removed, you could possibly have a leak. It would be  best to come to the ER so they can better evaluate you.  o Severe pain - pain is expected, but severe or new onset of pain is not normal.   o Inability to tolerate food or liquid with nausea and vomiting - it would be best to go to the ER for them to better evaluate you.

## 2018-12-23 NOTE — PLAN OF CARE
Problem: Adult Inpatient Plan of Care  Goal: Plan of Care Review  Outcome: Ongoing (interventions implemented as appropriate)  Patient AAOx4 and VSS throughout the night. Q2H rounding and white board updating maintained. Call bell within reach. Patient had minimal complaints of pain throughout the night. Incision pain is the only pain noted. Patient ambulated well. No signs of skin breakdown noted. WCTM.

## 2018-12-23 NOTE — NURSING
Discharge instructions given to patient. Verbalizes understanding. IV removed, catheter intact. No complaints/concerns voiced at this time.

## 2018-12-23 NOTE — ANESTHESIA POSTPROCEDURE EVALUATION
"Anesthesia Post Evaluation    Patient: Nolvia Garcia    Procedure(s) Performed: Procedure(s) (LRB):  ROBOTIC NEPHRECTOMY, PARTIAL (Left)    Final Anesthesia Type: general  Patient location during evaluation: floor  Patient participation: Yes- Able to Participate  Level of consciousness: awake and alert  Post-procedure vital signs: reviewed and stable  Pain management: adequate  Airway patency: patent  PONV status at discharge: No PONV  Anesthetic complications: no      Cardiovascular status: blood pressure returned to baseline and stable  Respiratory status: unassisted and spontaneous ventilation  Hydration status: euvolemic  Follow-up not needed.        Visit Vitals  /79 (BP Location: Left arm, Patient Position: Lying)   Pulse 75   Temp 36.2 °C (97.2 °F) (Oral)   Resp 18   Ht 4' 11" (1.499 m)   Wt 83 kg (183 lb)   LMP 09/11/2018   SpO2 96%   Breastfeeding? No   BMI 36.96 kg/m²       Pain/Zaynab Score: Pain Rating Prior to Med Admin: 7 (12/22/2018  9:20 PM)  Pain Rating Post Med Admin: 3 (12/22/2018  9:20 PM)        "

## 2018-12-23 NOTE — ASSESSMENT & PLAN NOTE
Nolvia Garcia is a 43 y.o. female s/p left partial nephrectomy on 12/21/18    - Regular diet  - SLIV  - Ambulate/oob  - JARROD/SCDs  - Voiding clear and without issues  - Pain and nausea control  - H/H drop this am, will recheck at 11 am  - Home meds restarted    DC this morning if repeat H/H is stable

## 2018-12-23 NOTE — PROGRESS NOTES
[facility]  Urology  Progress Note    Patient Name: Nolvia Garcia  MRN: [id number]  Admission Date: 12/21/2018  Hospital Length of Stay: 2 days  Code Status: Prior   Attending Provider: Justin Vo MD   Primary Care Physician: Ten Hunt MD    Subjective:     HPI:  Nolvia Garcia is a 43 y.o. female s/p left partial nephrectomy on 12/21/18    Interval History:   No acute events overnight  Pain better controlled today  Ambulating well  Tolerating regular diet  H/H down to 8/24.8 this am, asymptomatic     Review of Systems  Objective:     Temp:  [96 °F (35.6 °C)-98.2 °F (36.8 °C)] 96.1 °F (35.6 °C)  Pulse:  [61-85] 62  Resp:  [18-20] 18  SpO2:  [93 %-96 %] 93 %  BP: (103-143)/(60-69) 104/60     Body mass index is 36.96 kg/m².            Drains          None          Physical Exam   Constitutional: She is oriented to person, place, and time. No distress.   HENT:   Head: Normocephalic and atraumatic.   Eyes: Conjunctivae are normal. No scleral icterus.   Neck: Normal range of motion. No JVD present.   Cardiovascular: Normal rate and regular rhythm.    Pulmonary/Chest: Effort normal. No respiratory distress.   Abdominal: Soft. She exhibits no distension. There is tenderness (appropriate). There is no rebound and no guarding.   Incisions c/d/i   Musculoskeletal: Normal range of motion.   SCDs in place   Neurological: She is alert and oriented to person, place, and time.   Skin: Skin is warm and dry. She is not diaphoretic. No pallor.     Psychiatric: She has a normal mood and affect. Her behavior is normal.       Significant Labs:    BMP:  Recent Labs   Lab 12/22/18  0505 12/23/18  0351    140   K 4.2 3.8    107   CO2 26 28   BUN 11 13   CREATININE 0.9 0.8   CALCIUM 8.2* 8.1*       CBC:   Recent Labs   Lab 12/22/18  0505 12/23/18  0351   WBC 14.10* 9.18   HGB 9.4* 8.0*   HCT 28.6* 24.8*   * 250       All pertinent labs results from the past 24 hours have been  reviewed.    Significant Imaging:  All pertinent imaging results/findings from the past 24 hours have been reviewed.      Assessment/Plan:     * Renal mass    Nolvia Garcia is a 43 y.o. female s/p left partial nephrectomy on 12/21/18    - Regular diet  - SLIV  - Ambulate/oob  - JARROD/SCDs  - Voiding clear and without issues  - Pain and nausea control  - H/H drop this am, will recheck at 11 am  - Home meds restarted    DC this morning if repeat H/H is stable         VTE Risk Mitigation (From admission, onward)        Ordered     IP VTE HIGH RISK PATIENT  Once      12/21/18 1751     Place sequential compression device  Until discontinued      12/21/18 1751     Place JARROD hose  Until discontinued      12/21/18 1751     Place JARROD hose  Until discontinued      12/21/18 1010     Place sequential compression device  Until discontinued      12/21/18 1010          Zohra Sexton MD  Urology  [facility]

## 2018-12-23 NOTE — SUBJECTIVE & OBJECTIVE
Interval History:   No acute events overnight  Pain better controlled today  Ambulating well  Tolerating regular diet  H/H down to 8/24.8 this am, asymptomatic     Review of Systems  Objective:     Temp:  [96 °F (35.6 °C)-98.2 °F (36.8 °C)] 96.1 °F (35.6 °C)  Pulse:  [61-85] 62  Resp:  [18-20] 18  SpO2:  [93 %-96 %] 93 %  BP: (103-143)/(60-69) 104/60     Body mass index is 36.96 kg/m².            Drains          None          Physical Exam   Constitutional: She is oriented to person, place, and time. No distress.   HENT:   Head: Normocephalic and atraumatic.   Eyes: Conjunctivae are normal. No scleral icterus.   Neck: Normal range of motion. No JVD present.   Cardiovascular: Normal rate and regular rhythm.    Pulmonary/Chest: Effort normal. No respiratory distress.   Abdominal: Soft. She exhibits no distension. There is tenderness (appropriate). There is no rebound and no guarding.   Incisions c/d/i   Musculoskeletal: Normal range of motion.   SCDs in place   Neurological: She is alert and oriented to person, place, and time.   Skin: Skin is warm and dry. She is not diaphoretic. No pallor.     Psychiatric: She has a normal mood and affect. Her behavior is normal.       Significant Labs:    BMP:  Recent Labs   Lab 12/22/18  0505 12/23/18  0351    140   K 4.2 3.8    107   CO2 26 28   BUN 11 13   CREATININE 0.9 0.8   CALCIUM 8.2* 8.1*       CBC:   Recent Labs   Lab 12/22/18  0505 12/23/18  0351   WBC 14.10* 9.18   HGB 9.4* 8.0*   HCT 28.6* 24.8*   * 250       All pertinent labs results from the past 24 hours have been reviewed.    Significant Imaging:  All pertinent imaging results/findings from the past 24 hours have been reviewed.

## 2018-12-24 NOTE — DISCHARGE SUMMARY
[facility]  Urology  Discharge Summary      Patient Name: Nolvia Garcia  MRN: [id number]  Admission Date: 12/21/2018  Hospital Length of Stay: 2 days  Discharge Date and Time: 12/23/2018  2:30 PM  Attending Physician: Justin Vo MD  Discharging Provider: Serge Liu MD  Primary Care Physician: Ten Hunt MD    HPI:   Nolvia Garcia is a 43 y.o. female s/p left partial nephrectomy on 12/21/18    Procedure(s) (LRB):  ROBOTIC NEPHRECTOMY, PARTIAL (Left)     Indwelling Lines/Drains at time of discharge:   Lines/Drains/Airways          None          Hospital Course (synopsis of major diagnoses, care, treatment, and services provided during the course of the hospital stay): Patient underwent robotic left partial nephrectomy on 12/21/18. She tolerated the procedure well and was transferred to the floor from PACU following surgery. She ambulated, tolerated a regular diet, and her pain was controlled. Her sung was removed the AM of 12/22 and she passed a voiding trial. Her H/H dropped to 8.0/24.8 on 12/23 AM compared to 9.4/28.6 on 12/22. Repeat H/H later that morning was stable. She was deemed safe for discharge on 12/23.    Consults:     Significant Diagnostic Studies: See chart review    Pending Diagnostic Studies:     None          Final Active Diagnoses:    Diagnosis Date Noted POA    PRINCIPAL PROBLEM:  Renal mass [N28.89] 12/21/2018 Yes      Problems Resolved During this Admission:         Discharged Condition: good    Disposition: Home or Self Care    Follow Up:  Follow-up Information     Justin Vo MD In 2 weeks.    Specialty:  Urology  Why:  for post op check  Contact information:  [address] [facility] [id number]  [phone]                 Patient Instructions:      Call MD for:  persistent nausea and vomiting or diarrhea     Call MD for:  severe uncontrolled pain     Call MD for:  persistent dizziness, light-headedness, or visual disturbances      Call MD for:   Order Comments: Temperature > 101F     Medications:  Reconciled Home Medications:      Medication List      START taking these medications    HEALTHYLAX 17 gram Pwpk  Generic drug:  polyethylene glycol  mix 1 packet with water and drink by mouth once daily.     oxyCODONE-acetaminophen 5-325 mg per tablet  Commonly known as:  PERCOCET  Take 1 tablet by mouth every 4 (four) hours as needed for Pain.        CHANGE how you take these medications    topiramate 50 MG tablet  Commonly known as:  TOPAMAX  One daily for headache prevention  What changed:    · how much to take  · how to take this  · when to take this  · additional instructions        CONTINUE taking these medications    ibuprofen 800 MG tablet  Commonly known as:  ADVIL,MOTRIN  Take 1 tablet (800 mg total) by mouth every 8 (eight) hours as needed (pain/cramping).     sumatriptan 100 MG tablet  Commonly known as:  IMITREX  Take one tablet at onset of headache. May repeat in 2 hours if needed. Max of 2 tabs in 24 hours        STOP taking these medications    HYDROcodone-acetaminophen 5-325 mg per tablet  Commonly known as:  NORCO            Time spent on the discharge of patient: 20 minutes    Serge Liu MD  Urology  [facility]

## 2018-12-24 NOTE — ANESTHESIA POSTPROCEDURE EVALUATION
"Anesthesia Post Evaluation    Patient: Nolvia Garcia    Procedure(s) Performed: Procedure(s) (LRB):  ROBOTIC NEPHRECTOMY, PARTIAL (Left)    Final Anesthesia Type: general  Patient location during evaluation: PACU  Patient participation: Yes- Able to Participate  Level of consciousness: awake and alert and oriented  Post-procedure vital signs: reviewed and stable  Pain management: adequate  Airway patency: patent  PONV status at discharge: No PONV  Anesthetic complications: no      Cardiovascular status: blood pressure returned to baseline and hemodynamically stable  Respiratory status: unassisted  Hydration status: euvolemic  Follow-up not needed.        Visit Vitals  /62 (BP Location: Left arm, Patient Position: Lying)   Pulse 74   Temp 36.2 °C (97.1 °F) (Oral)   Resp 18   Ht 4' 11" (1.499 m)   Wt 83 kg (183 lb)   LMP 09/11/2018   SpO2 95%   Breastfeeding? No   BMI 36.96 kg/m²       Pain/Zaynab Score: Pain Rating Prior to Med Admin: 7 (12/23/2018  1:35 PM)        "

## 2019-01-08 ENCOUNTER — OFFICE VISIT (OUTPATIENT)
Dept: UROLOGY | Facility: CLINIC | Age: 44
End: 2019-01-08
Payer: MEDICAID

## 2019-01-08 VITALS
WEIGHT: 182.56 LBS | SYSTOLIC BLOOD PRESSURE: 136 MMHG | HEART RATE: 55 BPM | DIASTOLIC BLOOD PRESSURE: 73 MMHG | BODY MASS INDEX: 35.84 KG/M2 | HEIGHT: 60 IN

## 2019-01-08 DIAGNOSIS — C64.2 RENAL CELL CARCINOMA OF LEFT KIDNEY: Primary | ICD-10-CM

## 2019-01-08 PROCEDURE — 99024 PR POST-OP FOLLOW-UP VISIT: ICD-10-PCS | Mod: ,,, | Performed by: UROLOGY

## 2019-01-08 PROCEDURE — 99024 POSTOP FOLLOW-UP VISIT: CPT | Mod: ,,, | Performed by: UROLOGY

## 2019-01-08 PROCEDURE — 99999 PR PBB SHADOW E&M-EST. PATIENT-LVL IV: CPT | Mod: PBBFAC,,, | Performed by: UROLOGY

## 2019-01-08 PROCEDURE — 99999 PR PBB SHADOW E&M-EST. PATIENT-LVL IV: ICD-10-PCS | Mod: PBBFAC,,, | Performed by: UROLOGY

## 2019-01-08 PROCEDURE — 99214 OFFICE O/P EST MOD 30 MIN: CPT | Mod: PBBFAC | Performed by: UROLOGY

## 2019-01-08 NOTE — PROGRESS NOTES
Urology - [facility]    SUBJECTIVE:     Chief Complaint: left renal mass    History of Present Illness:  Nolvia Garcia is a 43 y.o. female who returns to clinic as f/u after a left robotic partial nephrectomy.    CT was originally done in the ED for abdominal pain. She was referred to urology and CT with and without revealed 2.5 cm left midpole, posterior enhancing, solid renal mass. Nephrometry score R-1, E- 2, N-1, A-p, L-3 = 7p.    She underwent left robotic partial nephrectomy on 12/21/18.      Never smoker.   She works as a .  No family history of kidney or bladder cancer.    History of appendectomy in 1997.      PATHOLOGY:  1) Left renal mass.   FINAL PATHOLOGIC DIAGNOSIS   EXCISION FROM THE LEFT KIDNEY:   RENAL CELL CARCINOMA WITH NO INVASION INTO PERINEPHRIC ADIPOSE TISSUE IDENTIFIED   Diagnosed by: Jose Brewer M.D.   (Electronically Signed: 2019-01-04 11:21:54)   Microscopic Examination   Kidney: Left, partial nephrectomy:   Tumor site: Renal parenchyma   Tumor size: 1.7 cm.   Tumor focality: Unifocal   Anatomic extent of tumor: No invasion into perinephric adipose tissue identified   Histologic type: Renal cell carcinoma, clear cell variant   rdGrdrrdarddrderd:rd rd3rd of 4.   Sarcomatoid features: Not identified.   Rhabdoid Features: Not identified   Tumor Necrosis: Not identified   Adrenal gland: Absent   Margins: The neoplasm does extend to the inked edge of the specimen deeply and focally at the periphery as well.   Kidney apart from tumor: No significant abnormality   Hilar lymph nodes: Absent   Pathologic stage (AJCC 2010): pT1a pNX pMX   Block for possible further studies: 1a       Review of patient's allergies indicates:  No Known Allergies    Past Medical History:   Diagnosis Date    Abnormal Pap smear of cervix     Anxiety     GERD (gastroesophageal reflux disease)     Migraines      Past Surgical History:   Procedure Laterality Date    ABLATION, ENDOMETRIUM, THERMAL N/A 6/13/2018     [facility] at [facility] OR    APPENDECTOMY      CYSTOSCOPY N/A 10/23/2018    [facility] at [facility] OR    DILATION AND CURETTAGE, UTERUS N/A 6/13/2018    [facility] at [facility] OR    HYSTERECTOMY, TOTAL, LAPAROSCOPIC N/A 10/23/2018    [facility] at [facility] OR    OOPHORECTOMY, LAPAROSCOPIC Right 10/23/2018    [facility] at [facility] OR    ROBOTIC NEPHRECTOMY, PARTIAL Left 12/21/2018    [facility] OR 2ND F[facility]    SALPINGECTOMY, LAPAROSCOPIC Bilateral 10/23/2018    [facility] at [facility] OR    TUBAL LIGATION       Family History   Problem Relation Age of Onset    No Known Problems Mother     No Known Problems Father     Breast cancer Maternal Grandmother     Colon cancer Neg Hx     Ovarian cancer Neg Hx      Social History     Tobacco Use    Smoking status: Never Smoker    Smokeless tobacco: Never Used   Substance Use Topics    Alcohol use: Yes     Alcohol/week: 0.0 oz     Comment: rare    Drug use: No      Review of Systems   Constitutional: Negative for chills and fever.   HENT: Negative for sore throat and trouble swallowing.    Eyes: Negative for pain and discharge.   Respiratory: Negative for shortness of breath and wheezing.    Cardiovascular: Negative for chest pain and palpitations.   Gastrointestinal: Negative for abdominal pain, diarrhea, nausea and vomiting.   Genitourinary:        As per HPI   Musculoskeletal: Positive for gait problem (slight limp). Negative for back pain and joint swelling.   Skin: Negative for rash and wound.   Neurological: Positive for numbness (left lower extremity). Negative for seizures, weakness and headaches.   Psychiatric/Behavioral: Negative for hallucinations. The patient is not nervous/anxious.        OBJECTIVE:     Anticoagulation:  No    Estimated body mass index is 35.65 kg/m² as calculated from the following:    Height as of this  encounter: 5' (1.524 m).    Weight as of this encounter: 82.8 kg (182 lb 8.7 oz).    Vital Signs (Most Recent)  Pulse: (!) 55 (01/08/19 0949)  BP: 136/73 (01/08/19 0949)    Physical Exam   Constitutional: She is oriented to person, place, and time. She appears well-developed and well-nourished. No distress.   HENT:   Head: Normocephalic and atraumatic.   Eyes: No scleral icterus.   Neck: No tracheal deviation present.   Pulmonary/Chest: Effort normal. No respiratory distress.   Neurological: She is alert and oriented to person, place, and time.   Psychiatric: She has a normal mood and affect. Her behavior is normal. Judgment and thought content normal.     BMP  Lab Results   Component Value Date     12/23/2018    K 3.8 12/23/2018     12/23/2018    CO2 28 12/23/2018    BUN 13 12/23/2018    CREATININE 0.8 12/23/2018    CALCIUM 8.1 (L) 12/23/2018    ANIONGAP 5 (L) 12/23/2018    ESTGFRAFRICA >60.0 12/23/2018    EGFRNONAA >60.0 12/23/2018     Lab Results   Component Value Date    WBC 9.94 12/23/2018    HGB 8.3 (L) 12/23/2018    HCT 26.2 (L) 12/23/2018    MCV 87 12/23/2018     12/23/2018     Imaging:    CT W/WO personally reviewed and demonstrated a 2.5 cm left midpole, posterior, enhancing, solid renal mass.     ASSESSMENT     1. Renal cell carcinoma of left kidney      PLAN:     -CT/CXR/BMP in 6 months  -refer to PT  -refer to genetics given age <46 and RCC        Justin Vo MD

## 2019-07-22 ENCOUNTER — OFFICE VISIT (OUTPATIENT)
Dept: UROLOGY | Facility: CLINIC | Age: 44
End: 2019-07-22
Payer: MEDICAID

## 2019-07-22 ENCOUNTER — HOSPITAL ENCOUNTER (OUTPATIENT)
Dept: RADIOLOGY | Facility: HOSPITAL | Age: 44
Discharge: HOME | End: 2019-07-22
Attending: UROLOGY
Payer: MEDICAID

## 2019-07-22 VITALS
TEMPERATURE: 98 F | HEIGHT: 59 IN | HEART RATE: 59 BPM | DIASTOLIC BLOOD PRESSURE: 79 MMHG | SYSTOLIC BLOOD PRESSURE: 131 MMHG | BODY MASS INDEX: 38.51 KG/M2 | WEIGHT: 191 LBS

## 2019-07-22 DIAGNOSIS — C64.2 RENAL CELL CARCINOMA OF LEFT KIDNEY: ICD-10-CM

## 2019-07-22 DIAGNOSIS — C64.2 RENAL CELL CARCINOMA OF LEFT KIDNEY: Primary | ICD-10-CM

## 2019-07-22 DIAGNOSIS — N28.89 LEFT RENAL MASS: Primary | ICD-10-CM

## 2019-07-22 PROCEDURE — 71046 X-RAY EXAM CHEST 2 VIEWS: CPT | Mod: TC

## 2019-07-22 PROCEDURE — 71046 XR CHEST PA AND LATERAL: ICD-10-PCS | Mod: 26,,, | Performed by: RADIOLOGY

## 2019-07-22 PROCEDURE — 74170 CT ABDOMEN W WO CONTRAST: ICD-10-PCS | Mod: 26,,, | Performed by: RADIOLOGY

## 2019-07-22 PROCEDURE — 71046 X-RAY EXAM CHEST 2 VIEWS: CPT | Mod: 26,,, | Performed by: RADIOLOGY

## 2019-07-22 PROCEDURE — 25500020 PHARM REV CODE 255: Performed by: UROLOGY

## 2019-07-22 PROCEDURE — 99213 PR OFFICE/OUTPT VISIT, EST, LEVL III, 20-29 MIN: ICD-10-PCS | Mod: S$PBB,,, | Performed by: UROLOGY

## 2019-07-22 PROCEDURE — 74170 CT ABD WO CNTRST FLWD CNTRST: CPT | Mod: TC

## 2019-07-22 PROCEDURE — 99999 PR PBB SHADOW E&M-EST. PATIENT-LVL III: ICD-10-PCS | Mod: PBBFAC,,, | Performed by: UROLOGY

## 2019-07-22 PROCEDURE — 99213 OFFICE O/P EST LOW 20 MIN: CPT | Mod: PBBFAC,25 | Performed by: UROLOGY

## 2019-07-22 PROCEDURE — 74170 CT ABD WO CNTRST FLWD CNTRST: CPT | Mod: 26,,, | Performed by: RADIOLOGY

## 2019-07-22 PROCEDURE — 99213 OFFICE O/P EST LOW 20 MIN: CPT | Mod: S$PBB,,, | Performed by: UROLOGY

## 2019-07-22 PROCEDURE — 99999 PR PBB SHADOW E&M-EST. PATIENT-LVL III: CPT | Mod: PBBFAC,,, | Performed by: UROLOGY

## 2019-07-22 RX ADMIN — IOHEXOL 75 ML: 350 INJECTION, SOLUTION INTRAVENOUS at 11:07

## 2019-07-22 NOTE — PROGRESS NOTES
Urology - [facility]    SUBJECTIVE:     Chief Complaint: left renal mass    History of Present Illness:  Nolvia Garcia is a 43 y.o. female who returns to clinic as f/u after a left robotic partial nephrectomy.    CT was originally done in the ED for abdominal pain. She was referred to urology and CT with and without revealed 2.5 cm left midpole, posterior enhancing, solid renal mass. Nephrometry score R-1, E- 2, N-1, A-p, L-3 = 7p.    She underwent left robotic partial nephrectomy on 12/21/18.  She returns today for post-op imaging.     CT scan was independently reviewed today and reveals post-op changes.  CXR was independently reviewed today and reveals no acute findings.           Never smoker.   She works as a .  No family history of kidney or bladder cancer.    History of appendectomy in 1997.      PATHOLOGY:  1) Left renal mass.   FINAL PATHOLOGIC DIAGNOSIS   EXCISION FROM THE LEFT KIDNEY:   RENAL CELL CARCINOMA WITH NO INVASION INTO PERINEPHRIC ADIPOSE TISSUE IDENTIFIED   Diagnosed by: Jose Brewer M.D.   (Electronically Signed: 2019-01-04 11:21:54)   Microscopic Examination   Kidney: Left, partial nephrectomy:   Tumor site: Renal parenchyma   Tumor size: 1.7 cm.   Tumor focality: Unifocal   Anatomic extent of tumor: No invasion into perinephric adipose tissue identified   Histologic type: Renal cell carcinoma, clear cell variant   stGstrstastdstest:st st1st of 4.   Sarcomatoid features: Not identified.   Rhabdoid Features: Not identified   Tumor Necrosis: Not identified   Adrenal gland: Absent   Margins: The neoplasm does extend to the inked edge of the specimen deeply and focally at the periphery as well.   Kidney apart from tumor: No significant abnormality   Hilar lymph nodes: Absent   Pathologic stage (AJCC 2010): pT1a pNX pMX   Block for possible further studies: 1a       Review of patient's allergies indicates:  No Known Allergies    Past Medical History:   Diagnosis Date    Abnormal Pap  smear of cervix     Anxiety     GERD (gastroesophageal reflux disease)     Migraines      Past Surgical History:   Procedure Laterality Date    ABLATION, ENDOMETRIUM, THERMAL N/A 6/13/2018    [facility] at [facility] OR    APPENDECTOMY      CYSTOSCOPY N/A 10/23/2018    [facility] at [facility] OR    DILATION AND CURETTAGE, UTERUS N/A 6/13/2018    [facility] at [facility] OR    HYSTERECTOMY, TOTAL, LAPAROSCOPIC N/A 10/23/2018    [facility] at [facility] OR    OOPHORECTOMY, LAPAROSCOPIC Right 10/23/2018    [facility] at [facility] OR    ROBOTIC NEPHRECTOMY, PARTIAL Left 12/21/2018    [facility] 2ND F[facility]    SALPINGECTOMY, LAPAROSCOPIC Bilateral 10/23/2018    [facility] at [facility] OR    TUBAL LIGATION       Family History   Problem Relation Age of Onset    No Known Problems Mother     No Known Problems Father     Breast cancer Maternal Grandmother     Colon cancer Neg Hx     Ovarian cancer Neg Hx      Social History     Tobacco Use    Smoking status: Never Smoker    Smokeless tobacco: Never Used   Substance Use Topics    Alcohol use: Yes     Alcohol/week: 0.0 oz     Comment: rare    Drug use: No      Review of Systems   Constitutional: Negative for chills and fever.   HENT: Negative for sore throat and trouble swallowing.    Eyes: Negative for pain and discharge.   Respiratory: Negative for shortness of breath and wheezing.    Cardiovascular: Negative for chest pain and palpitations.   Gastrointestinal: Negative for abdominal pain, diarrhea, nausea and vomiting.   Genitourinary:        As per HPI   Musculoskeletal: Negative for back pain and joint swelling. Gait problem: slight limp.   Skin: Negative for rash and wound.   Neurological: Positive for numbness (left lower extremity). Negative for seizures, weakness and headaches.   Psychiatric/Behavioral: Negative for hallucinations.  "The patient is not nervous/anxious.        OBJECTIVE:     Anticoagulation:  No    Estimated body mass index is 38.58 kg/m² as calculated from the following:    Height as of this encounter: 4' 11" (1.499 m).    Weight as of this encounter: 86.6 kg (191 lb).    Vital Signs (Most Recent)  Temp: 98.2 °F (36.8 °C) (07/22/19 1501)  Pulse: (!) 59 (07/22/19 1501)  BP: 131/79 (07/22/19 1501)    Physical Exam   Constitutional: She is oriented to person, place, and time. She appears well-developed and well-nourished. No distress.   HENT:   Head: Normocephalic and atraumatic.   Eyes: No scleral icterus.   Neck: No tracheal deviation present.   Pulmonary/Chest: Effort normal. No respiratory distress.   Neurological: She is alert and oriented to person, place, and time.   Psychiatric: She has a normal mood and affect. Her behavior is normal. Judgment and thought content normal.     BMP  Lab Results   Component Value Date     12/23/2018    K 3.8 12/23/2018     12/23/2018    CO2 28 12/23/2018    BUN 13 12/23/2018    CREATININE 0.8 07/22/2019    CALCIUM 8.1 (L) 12/23/2018    ANIONGAP 5 (L) 12/23/2018    ESTGFRAFRICA >60 07/22/2019    EGFRNONAA >60 07/22/2019     Lab Results   Component Value Date    WBC 9.94 12/23/2018    HGB 8.3 (L) 12/23/2018    HCT 26.2 (L) 12/23/2018    MCV 87 12/23/2018     12/23/2018     Imaging:    CT W/WO personally reviewed and demonstrated a 2.5 cm left midpole, posterior, enhancing, solid renal mass.     ASSESSMENT     1. Renal cell carcinoma of left kidney      PLAN:     -CT/CXR/BMP in 12 months    I spent 15 minutes with the patient of which more than half was spent in direct consultation with the patient in regards to our treatment and plan.          Justin Vo MD          "

## 2019-12-19 ENCOUNTER — HOSPITAL ENCOUNTER (EMERGENCY)
Facility: HOSPITAL | Age: 44
Discharge: HOME | End: 2019-12-19
Attending: SURGERY
Payer: MEDICAID

## 2019-12-19 ENCOUNTER — TELEPHONE (OUTPATIENT)
Dept: FAMILY MEDICINE | Facility: CLINIC | Age: 44
End: 2019-12-19

## 2019-12-19 VITALS
RESPIRATION RATE: 19 BRPM | HEART RATE: 110 BPM | BODY MASS INDEX: 38.41 KG/M2 | TEMPERATURE: 100 F | OXYGEN SATURATION: 98 % | SYSTOLIC BLOOD PRESSURE: 123 MMHG | DIASTOLIC BLOOD PRESSURE: 81 MMHG | WEIGHT: 190.13 LBS

## 2019-12-19 DIAGNOSIS — J00 ACUTE NASOPHARYNGITIS: Primary | ICD-10-CM

## 2019-12-19 DIAGNOSIS — J32.0 MAXILLARY SINUSITIS, UNSPECIFIED CHRONICITY: ICD-10-CM

## 2019-12-19 LAB
INFLUENZA A, MOLECULAR: NEGATIVE
INFLUENZA B, MOLECULAR: NEGATIVE
SPECIMEN SOURCE: NORMAL

## 2019-12-19 PROCEDURE — 87502 INFLUENZA DNA AMP PROBE: CPT

## 2019-12-19 PROCEDURE — 25000003 PHARM REV CODE 250: Performed by: SURGERY

## 2019-12-19 PROCEDURE — 99284 EMERGENCY DEPT VISIT MOD MDM: CPT

## 2019-12-19 RX ORDER — AZITHROMYCIN 250 MG/1
TABLET, FILM COATED ORAL
Qty: 6 TABLET | Refills: 0 | Status: SHIPPED | OUTPATIENT
Start: 2019-12-19 | End: 2021-07-09

## 2019-12-19 RX ORDER — TRAMADOL HYDROCHLORIDE 50 MG/1
50 TABLET ORAL
Status: COMPLETED | OUTPATIENT
Start: 2019-12-19 | End: 2019-12-19

## 2019-12-19 RX ORDER — CODEINE PHOSPHATE AND GUAIFENESIN 10; 100 MG/5ML; MG/5ML
5 SOLUTION ORAL EVERY 6 HOURS PRN
Qty: 100 ML | Refills: 0 | Status: SHIPPED | OUTPATIENT
Start: 2019-12-19 | End: 2021-07-09

## 2019-12-19 RX ORDER — GUAIFENESIN, PSEUDOEPHEDRINE HYDROCHLORIDE 600; 60 MG/1; MG/1
1 TABLET, EXTENDED RELEASE ORAL 2 TIMES DAILY
Qty: 20 TABLET | Refills: 0 | Status: SHIPPED | OUTPATIENT
Start: 2019-12-19 | End: 2019-12-29

## 2019-12-19 RX ORDER — GUAIFENESIN, PSEUDOEPHEDRINE HYDROCHLORIDE 600; 60 MG/1; MG/1
1 TABLET, EXTENDED RELEASE ORAL ONCE
Status: COMPLETED | OUTPATIENT
Start: 2019-12-19 | End: 2019-12-19

## 2019-12-19 RX ADMIN — GUAIFENESIN AND PSEUDOEPHEDRINE HYDROCHLORIDE 1 TABLET: 600; 60 TABLET, EXTENDED RELEASE ORAL at 04:12

## 2019-12-19 RX ADMIN — TRAMADOL HYDROCHLORIDE 50 MG: 50 TABLET, FILM COATED ORAL at 04:12

## 2019-12-19 NOTE — ED TRIAGE NOTES
44 y.o. female presents to ER ED 03 /ED 03A   Chief Complaint   Patient presents with    URI   Pt reports cough and congestion onset two days ago. No acute distress noted.

## 2019-12-19 NOTE — TELEPHONE ENCOUNTER
----- Message from Gary Barillas sent at 2019 11:38 AM CST -----  Contact: Patient  Nolvia Garcia  MRN: [id number]  : 1975  PCP: Ten Hunt  Home Phone      [phone]  Work Phone      Not on file.  Mobile          [phone]      MESSAGE: sinus issues - cough -- requesting appt today (any Dr)    Call [phone]    PCP: Marilee (bari elizondo)

## 2019-12-19 NOTE — TELEPHONE ENCOUNTER
Spoke with pt and she states that she has congestion, chills, and a cough. Recommended that pt get swabbed for the flu. Pt states that she will go to urgent care.

## 2019-12-19 NOTE — ED PROVIDER NOTES
Encounter Date: 12/19/2019       History     Chief Complaint   Patient presents with    URI     Patient is 44-year-old white female with nasal congestion and cough for 2-3 days.  No nausea, vomiting, diarrhea is reported.  No myalgias, fever or chills are reported.  She reports no difficulty in swallowing or breathing.  Cough is productive of yellowish nasal discharge.        Review of patient's allergies indicates:  No Known Allergies  Past Medical History:   Diagnosis Date    Abnormal Pap smear of cervix     Anxiety     GERD (gastroesophageal reflux disease)     Migraines      Past Surgical History:   Procedure Laterality Date    APPENDECTOMY      CYSTOSCOPY N/A 10/23/2018    Procedure: CYSTOSCOPY;  Surgeon: Lesly Mendoza MD;  [facility];  Service: OB/GYN;  Laterality: N/A;    DILATION AND CURETTAGE OF UTERUS N/A 6/13/2018    Procedure: DILATION AND CURETTAGE, UTERUS;  Surgeon: Lesly Mendoza MD;  Location: [facility];  Service: OB/GYN;  Laterality: N/A;    LAPAROSCOPIC OOPHORECTOMY Right 10/23/2018    Procedure: OOPHORECTOMY, LAPAROSCOPIC;  Surgeon: Lesly Mendoza MD;  Location: [facility];  Service: OB/GYN;  Laterality: Right;    LAPAROSCOPIC SALPINGECTOMY Bilateral 10/23/2018    Procedure: SALPINGECTOMY, LAPAROSCOPIC;  Surgeon: Lesly Mendoza MD;  Location: [facility];  Service: OB/GYN;  Laterality: Bilateral;    LAPAROSCOPIC TOTAL HYSTERECTOMY N/A 10/23/2018    Procedure: HYSTERECTOMY, TOTAL, LAPAROSCOPIC;  Surgeon: Lesly Mendoza MD;  Location: [facility];  Service: OB/GYN;  Laterality: N/A;    ROBOT-ASSISTED LAPAROSCOPIC PARTIAL NEPHRECTOMY Left 12/21/2018    Procedure: ROBOTIC NEPHRECTOMY, PARTIAL;  Surgeon: Justin Vo MD;  Location: [address];  Service: Urology;  Laterality: Left;  gen with regional  [employer] robotic drop in probe conformation #[id number] lb  T&S AM OF SURGERY    THERMAL ABLATION OF ENDOMETRIUM USING HYSTEROSCOPY N/A 6/13/2018    Procedure: ABLATION, ENDOMETRIUM,  THERMAL;  Surgeon: Lesly Mendoza MD;  Location: [facility] OR;  Service: OB/GYN;  Laterality: N/A;    TUBAL LIGATION       Family History   Problem Relation Age of Onset    No Known Problems Mother     No Known Problems Father     Breast cancer Maternal Grandmother     Colon cancer Neg Hx     Ovarian cancer Neg Hx      Social History     Tobacco Use    Smoking status: Never Smoker    Smokeless tobacco: Never Used   Substance Use Topics    Alcohol use: Yes     Alcohol/week: 0.0 standard drinks     Comment: rare    Drug use: No     Review of Systems   Constitutional: Negative for fever.   HENT: Positive for congestion and sinus pain. Negative for ear pain, rhinorrhea, sore throat and trouble swallowing.    Eyes: Negative for pain.   Respiratory: Negative for cough, shortness of breath and wheezing.    Cardiovascular: Negative for chest pain, palpitations and leg swelling.   Gastrointestinal: Negative for abdominal pain, constipation, diarrhea and nausea.   Genitourinary: Negative for difficulty urinating, dysuria, flank pain, frequency, hematuria and urgency.   Musculoskeletal: Negative for arthralgias, back pain, myalgias and neck pain.   Skin: Negative for pallor, rash and wound.   Neurological: Negative for speech difficulty, weakness and headaches.   Hematological: Does not bruise/bleed easily.       Physical Exam     Initial Vitals [12/19/19 1550]   BP Pulse Resp Temp SpO2   123/81 110 19 99.8 °F (37.7 °C) 98 %      MAP       --         Physical Exam    Nursing note and vitals reviewed.  Constitutional: No distress.   HENT:   Head: Normocephalic and atraumatic.   Nose: Nose normal.   Oropharyngeal erythema with some yellowish nasal discharge   Eyes: Conjunctivae and EOM are normal. Pupils are equal, round, and reactive to light.   Neck: Normal range of motion. Neck supple.   Cardiovascular: Normal rate, regular rhythm, normal heart sounds and intact distal pulses.   Pulmonary/Chest: Breath sounds normal. No  respiratory distress. She has no wheezes.   Abdominal: Soft. Bowel sounds are normal. She exhibits no distension. There is no tenderness.   Musculoskeletal: Normal range of motion.   Neurological: She is alert and oriented to person, place, and time. She has normal strength. No cranial nerve deficit.   Skin: Skin is warm and dry.   Psychiatric: She has a normal mood and affect. Thought content normal.         ED Course   Procedures  Labs Reviewed - No data to display       Imaging Results    None                                          Clinical Impression:       ICD-10-CM ICD-9-CM   1. Acute nasopharyngitis J00 460   2. Maxillary sinusitis, unspecified chronicity J32.0 473.0         Disposition:   Disposition: Discharged  Condition: Stable                     Herb Delacruz Jr., MD  12/19/19 4696

## 2021-01-13 DIAGNOSIS — N28.89 OTHER SPECIFIED DISORDERS OF KIDNEY AND URETER: ICD-10-CM

## 2021-01-13 DIAGNOSIS — C64.2 RENAL CELL CARCINOMA OF LEFT KIDNEY: Primary | ICD-10-CM

## 2021-01-15 ENCOUNTER — LAB VISIT (OUTPATIENT)
Dept: LAB | Facility: HOSPITAL | Age: 46
End: 2021-01-15
Attending: UROLOGY
Payer: MEDICAID

## 2021-01-15 DIAGNOSIS — N28.89 OTHER SPECIFIED DISORDERS OF KIDNEY AND URETER: ICD-10-CM

## 2021-01-15 DIAGNOSIS — C64.2 RENAL CELL CARCINOMA OF LEFT KIDNEY: ICD-10-CM

## 2021-01-15 LAB
ANION GAP SERPL CALC-SCNC: 10 MMOL/L (ref 8–16)
BUN SERPL-MCNC: 14 MG/DL (ref 6–20)
CALCIUM SERPL-MCNC: 8.6 MG/DL (ref 8.7–10.5)
CHLORIDE SERPL-SCNC: 104 MMOL/L (ref 95–110)
CO2 SERPL-SCNC: 26 MMOL/L (ref 23–29)
CREAT SERPL-MCNC: 0.8 MG/DL (ref 0.5–1.4)
EST. GFR  (AFRICAN AMERICAN): >60 ML/MIN/1.73 M^2
EST. GFR  (NON AFRICAN AMERICAN): >60 ML/MIN/1.73 M^2
GLUCOSE SERPL-MCNC: 88 MG/DL (ref 70–110)
POTASSIUM SERPL-SCNC: 3.9 MMOL/L (ref 3.5–5.1)
SODIUM SERPL-SCNC: 140 MMOL/L (ref 136–145)

## 2021-01-15 PROCEDURE — 80048 BASIC METABOLIC PNL TOTAL CA: CPT

## 2021-01-15 PROCEDURE — 36415 COLL VENOUS BLD VENIPUNCTURE: CPT

## 2021-01-19 ENCOUNTER — HOSPITAL ENCOUNTER (OUTPATIENT)
Dept: RADIOLOGY | Facility: HOSPITAL | Age: 46
Discharge: HOME | End: 2021-01-19
Attending: UROLOGY
Payer: MEDICAID

## 2021-01-19 DIAGNOSIS — N28.89 OTHER SPECIFIED DISORDERS OF KIDNEY AND URETER: ICD-10-CM

## 2021-01-19 DIAGNOSIS — C64.2 RENAL CELL CARCINOMA OF LEFT KIDNEY: ICD-10-CM

## 2021-01-19 PROCEDURE — 71046 XR CHEST PA AND LATERAL: ICD-10-PCS | Mod: 26,,, | Performed by: RADIOLOGY

## 2021-01-19 PROCEDURE — 74170 CT ABDOMEN W WO CONTRAST: ICD-10-PCS | Mod: 26,,, | Performed by: RADIOLOGY

## 2021-01-19 PROCEDURE — 74170 CT ABD WO CNTRST FLWD CNTRST: CPT | Mod: 26,,, | Performed by: RADIOLOGY

## 2021-01-19 PROCEDURE — 74170 CT ABD WO CNTRST FLWD CNTRST: CPT | Mod: TC

## 2021-01-19 PROCEDURE — 71046 X-RAY EXAM CHEST 2 VIEWS: CPT | Mod: 26,,, | Performed by: RADIOLOGY

## 2021-01-19 PROCEDURE — 71046 X-RAY EXAM CHEST 2 VIEWS: CPT | Mod: TC

## 2021-01-19 PROCEDURE — 25500020 PHARM REV CODE 255: Performed by: UROLOGY

## 2021-01-19 RX ADMIN — IOHEXOL 100 ML: 350 INJECTION, SOLUTION INTRAVENOUS at 12:01

## 2021-01-27 ENCOUNTER — OFFICE VISIT (OUTPATIENT)
Dept: UROLOGY | Facility: CLINIC | Age: 46
End: 2021-01-27
Payer: MEDICAID

## 2021-01-27 DIAGNOSIS — C64.2 RENAL CELL CARCINOMA OF LEFT KIDNEY: Primary | ICD-10-CM

## 2021-01-27 PROCEDURE — 99999 PR PBB SHADOW E&M-EST. PATIENT-LVL III: CPT | Mod: PBBFAC,,, | Performed by: UROLOGY

## 2021-01-27 PROCEDURE — 99213 OFFICE O/P EST LOW 20 MIN: CPT | Mod: S$PBB,,, | Performed by: UROLOGY

## 2021-01-27 PROCEDURE — 99999 PR PBB SHADOW E&M-EST. PATIENT-LVL III: ICD-10-PCS | Mod: PBBFAC,,, | Performed by: UROLOGY

## 2021-01-27 PROCEDURE — 99213 PR OFFICE/OUTPT VISIT, EST, LEVL III, 20-29 MIN: ICD-10-PCS | Mod: S$PBB,,, | Performed by: UROLOGY

## 2021-01-27 PROCEDURE — 99213 OFFICE O/P EST LOW 20 MIN: CPT | Mod: PBBFAC | Performed by: UROLOGY

## 2021-05-04 ENCOUNTER — PATIENT MESSAGE (OUTPATIENT)
Dept: RESEARCH | Facility: HOSPITAL | Age: 46
End: 2021-05-04

## 2021-07-09 ENCOUNTER — TELEPHONE (OUTPATIENT)
Dept: INTERNAL MEDICINE | Facility: CLINIC | Age: 46
End: 2021-07-09

## 2021-07-09 ENCOUNTER — OFFICE VISIT (OUTPATIENT)
Dept: INTERNAL MEDICINE | Facility: CLINIC | Age: 46
End: 2021-07-09
Payer: MEDICAID

## 2021-07-09 VITALS
DIASTOLIC BLOOD PRESSURE: 82 MMHG | SYSTOLIC BLOOD PRESSURE: 120 MMHG | HEIGHT: 59 IN | WEIGHT: 182.31 LBS | RESPIRATION RATE: 19 BRPM | BODY MASS INDEX: 36.75 KG/M2 | HEART RATE: 107 BPM | OXYGEN SATURATION: 97 %

## 2021-07-09 DIAGNOSIS — J02.9 PHARYNGITIS, UNSPECIFIED ETIOLOGY: Primary | ICD-10-CM

## 2021-07-09 LAB
CTP QC/QA: YES
S PYO RRNA THROAT QL PROBE: NEGATIVE

## 2021-07-09 PROCEDURE — 87880 STREP A ASSAY W/OPTIC: CPT | Mod: PBBFAC | Performed by: INTERNAL MEDICINE

## 2021-07-09 PROCEDURE — U0005 INFEC AGEN DETEC AMPLI PROBE: HCPCS | Performed by: INTERNAL MEDICINE

## 2021-07-09 PROCEDURE — U0003 INFECTIOUS AGENT DETECTION BY NUCLEIC ACID (DNA OR RNA); SEVERE ACUTE RESPIRATORY SYNDROME CORONAVIRUS 2 (SARS-COV-2) (CORONAVIRUS DISEASE [COVID-19]), AMPLIFIED PROBE TECHNIQUE, MAKING USE OF HIGH THROUGHPUT TECHNOLOGIES AS DESCRIBED BY CMS-2020-01-R: HCPCS | Performed by: INTERNAL MEDICINE

## 2021-07-09 PROCEDURE — 99999 PR PBB SHADOW E&M-EST. PATIENT-LVL III: CPT | Mod: PBBFAC,,, | Performed by: INTERNAL MEDICINE

## 2021-07-09 PROCEDURE — 99213 OFFICE O/P EST LOW 20 MIN: CPT | Mod: PBBFAC | Performed by: INTERNAL MEDICINE

## 2021-07-09 PROCEDURE — 99213 PR OFFICE/OUTPT VISIT, EST, LEVL III, 20-29 MIN: ICD-10-PCS | Mod: S$PBB,,, | Performed by: INTERNAL MEDICINE

## 2021-07-09 PROCEDURE — 99999 PR PBB SHADOW E&M-EST. PATIENT-LVL III: ICD-10-PCS | Mod: PBBFAC,,, | Performed by: INTERNAL MEDICINE

## 2021-07-09 PROCEDURE — 99213 OFFICE O/P EST LOW 20 MIN: CPT | Mod: S$PBB,,, | Performed by: INTERNAL MEDICINE

## 2021-07-09 RX ORDER — LIDOCAINE HYDROCHLORIDE 20 MG/ML
SOLUTION OROPHARYNGEAL EVERY 4 HOURS PRN
Qty: 100 ML | Refills: 0 | Status: SHIPPED | OUTPATIENT
Start: 2021-07-09

## 2021-07-09 RX ORDER — LORATADINE 10 MG/1
10 TABLET ORAL DAILY
COMMUNITY

## 2021-07-10 LAB
SARS-COV-2 RNA RESP QL NAA+PROBE: NOT DETECTED
SARS-COV-2- CYCLE NUMBER: -1

## 2021-07-12 ENCOUNTER — TELEPHONE (OUTPATIENT)
Dept: INTERNAL MEDICINE | Facility: CLINIC | Age: 46
End: 2021-07-12

## 2021-10-20 NOTE — TRANSFER OF CARE
"Anesthesia Transfer of Care Note    Patient: Nolvia Garcia    Procedure(s) Performed: Procedure(s) (LRB):  HYSTERECTOMY, TOTAL, LAPAROSCOPIC (N/A)  SALPINGECTOMY, LAPAROSCOPIC (Bilateral)  OOPHORECTOMY, LAPAROSCOPIC (Right)  CYSTOSCOPY (N/A)    Patient location: PACU    Anesthesia Type: general    Transport from OR: Transported from OR on 6-10 L/min O2 by face mask with adequate spontaneous ventilation    Post pain: adequate analgesia    Post assessment: no apparent anesthetic complications and tolerated procedure well    Post vital signs: stable    Level of consciousness: sedated    Nausea/Vomiting: no nausea/vomiting    Complications: none    Transfer of care protocol was followed      Last vitals:   Visit Vitals  /64 (BP Location: Left arm, Patient Position: Lying)   Pulse 60   Temp 36 °C (96.8 °F) (Tympanic)   Resp 18   Ht 4' 11" (1.499 m)   Wt 84.8 kg (187 lb)   SpO2 95%   Breastfeeding? No   BMI 37.77 kg/m²     "
0 = understands/communicates without difficulty

## 2022-01-20 ENCOUNTER — TELEPHONE (OUTPATIENT)
Dept: UROLOGY | Facility: CLINIC | Age: 47
End: 2022-01-20
Payer: MEDICAID

## 2022-01-20 NOTE — TELEPHONE ENCOUNTER
I spoke with patient, advised her ct scan approved , gave her ct scan instructions. Patient verbalized understanding.        ----- Message from Wendi Infante RN sent at 1/19/2022  5:22 PM CST -----  Regarding: CT Abd with contrast - Renal cell carcinoma of left kidney [C64.2]  I sent Teams messages with Manuel OJEDA and it was approved.  Pt is currently scheduled for 1/27/22 @ [facility].  Thanks, Wendi  ----- Message -----  From: Justin Vo MD  Sent: 1/19/2022   4:50 PM CST  To: Marie Eastman LPN, [facility] Anderson Staff    Re this patients CT scan denial, a peer to peer was performed and the following approval was obtained and expires 3/5/22    [id number]    There was no way or reply option for me to communicate this to anyone.  Can we see that this gets to pre-cert or whomever needs this?    Thank you.    Justin Vo

## 2022-01-27 ENCOUNTER — TELEPHONE (OUTPATIENT)
Dept: UROLOGY | Facility: CLINIC | Age: 47
End: 2022-01-27
Payer: MEDICAID

## 2022-01-27 NOTE — TELEPHONE ENCOUNTER
1508-I spoke to Anna brandt/ radiology at [facility] and extended orders to 2/3/2022.  Anna said pt is coming back on Mon 1/31/22 for lab and imaging.    ----- Message from Romana Nogueira sent at 1/27/2022  9:13 AM CST -----  Regarding: Orders  Contact: Anna brandt/Radiology [phone]  Calling to get orders put in system for testing and lab work to be rescheduled for Monday or beyond. Please call

## 2022-01-31 ENCOUNTER — HOSPITAL ENCOUNTER (OUTPATIENT)
Dept: RADIOLOGY | Facility: HOSPITAL | Age: 47
Discharge: HOME | End: 2022-01-31
Attending: UROLOGY
Payer: MEDICAID

## 2022-01-31 DIAGNOSIS — C64.2 RENAL CELL CARCINOMA OF LEFT KIDNEY: ICD-10-CM

## 2022-01-31 PROCEDURE — 71046 X-RAY EXAM CHEST 2 VIEWS: CPT | Mod: TC

## 2022-01-31 PROCEDURE — 71046 X-RAY EXAM CHEST 2 VIEWS: CPT | Mod: 26,,, | Performed by: RADIOLOGY

## 2022-01-31 PROCEDURE — 74170 CT ABD WO CNTRST FLWD CNTRST: CPT | Mod: 26,,, | Performed by: RADIOLOGY

## 2022-01-31 PROCEDURE — 74170 CT ABDOMEN W WO CONTRAST: ICD-10-PCS | Mod: 26,,, | Performed by: RADIOLOGY

## 2022-01-31 PROCEDURE — 74170 CT ABD WO CNTRST FLWD CNTRST: CPT | Mod: TC

## 2022-01-31 PROCEDURE — 71046 XR CHEST PA AND LATERAL: ICD-10-PCS | Mod: 26,,, | Performed by: RADIOLOGY

## 2022-01-31 PROCEDURE — 25500020 PHARM REV CODE 255: Performed by: UROLOGY

## 2022-01-31 RX ADMIN — IOHEXOL 75 ML: 350 INJECTION, SOLUTION INTRAVENOUS at 11:01

## 2022-09-15 DIAGNOSIS — Z12.11 COLON CANCER SCREENING: ICD-10-CM

## 2023-02-05 ENCOUNTER — HOSPITAL ENCOUNTER (EMERGENCY)
Facility: HOSPITAL | Age: 48
Discharge: HOME | End: 2023-02-05
Attending: SURGERY
Payer: MEDICAID

## 2023-02-05 DIAGNOSIS — E87.6 HYPOKALEMIA: ICD-10-CM

## 2023-02-05 DIAGNOSIS — A08.4 VIRAL GASTROENTERITIS: Primary | ICD-10-CM

## 2023-02-05 LAB
ALBUMIN SERPL BCP-MCNC: 3.4 G/DL (ref 3.5–5.2)
ALP SERPL-CCNC: 89 U/L (ref 55–135)
ALT SERPL W/O P-5'-P-CCNC: 16 U/L (ref 10–44)
AMPHET+METHAMPHET UR QL: NEGATIVE
ANION GAP SERPL CALC-SCNC: 12 MMOL/L (ref 8–16)
AST SERPL-CCNC: 17 U/L (ref 10–40)
B-HCG UR QL: NEGATIVE
BACTERIA #/AREA URNS HPF: ABNORMAL /HPF
BARBITURATES UR QL SCN>200 NG/ML: NEGATIVE
BASOPHILS # BLD AUTO: 0.03 K/UL (ref 0–0.2)
BASOPHILS NFR BLD: 0.2 % (ref 0–1.9)
BENZODIAZ UR QL SCN>200 NG/ML: NEGATIVE
BILIRUB SERPL-MCNC: 0.7 MG/DL (ref 0.1–1)
BILIRUB UR QL STRIP: ABNORMAL
BUN SERPL-MCNC: 14 MG/DL (ref 6–20)
BZE UR QL SCN: NEGATIVE
CALCIUM SERPL-MCNC: 8.6 MG/DL (ref 8.7–10.5)
CANNABINOIDS UR QL SCN: NEGATIVE
CHLORIDE SERPL-SCNC: 100 MMOL/L (ref 95–110)
CLARITY UR: ABNORMAL
CO2 SERPL-SCNC: 25 MMOL/L (ref 23–29)
COLOR UR: YELLOW
CREAT SERPL-MCNC: 0.8 MG/DL (ref 0.5–1.4)
CREAT UR-MCNC: 308.5 MG/DL (ref 15–325)
DIFFERENTIAL METHOD: ABNORMAL
EOSINOPHIL # BLD AUTO: 0 K/UL (ref 0–0.5)
EOSINOPHIL NFR BLD: 0.1 % (ref 0–8)
ERYTHROCYTE [DISTWIDTH] IN BLOOD BY AUTOMATED COUNT: 13.2 % (ref 11.5–14.5)
EST. GFR  (NO RACE VARIABLE): >60 ML/MIN/1.73 M^2
GLUCOSE SERPL-MCNC: 107 MG/DL (ref 70–110)
GLUCOSE UR QL STRIP: NEGATIVE
HCT VFR BLD AUTO: 36.2 % (ref 37–48.5)
HGB BLD-MCNC: 12.1 G/DL (ref 12–16)
HGB UR QL STRIP: ABNORMAL
HYALINE CASTS #/AREA URNS LPF: 0 /LPF
IMM GRANULOCYTES # BLD AUTO: 0.07 K/UL (ref 0–0.04)
IMM GRANULOCYTES NFR BLD AUTO: 0.5 % (ref 0–0.5)
INFLUENZA A, MOLECULAR: NEGATIVE
INFLUENZA B, MOLECULAR: NEGATIVE
KETONES UR QL STRIP: ABNORMAL
LEUKOCYTE ESTERASE UR QL STRIP: NEGATIVE
LIPASE SERPL-CCNC: 7 U/L (ref 4–60)
LYMPHOCYTES # BLD AUTO: 0.7 K/UL (ref 1–4.8)
LYMPHOCYTES NFR BLD: 5.6 % (ref 18–48)
MAGNESIUM SERPL-MCNC: 2.2 MG/DL (ref 1.6–2.6)
MCH RBC QN AUTO: 28.4 PG (ref 27–31)
MCHC RBC AUTO-ENTMCNC: 33.4 G/DL (ref 32–36)
MCV RBC AUTO: 85 FL (ref 82–98)
METHADONE UR QL SCN>300 NG/ML: NEGATIVE
MICROSCOPIC COMMENT: ABNORMAL
MONOCYTES # BLD AUTO: 0.6 K/UL (ref 0.3–1)
MONOCYTES NFR BLD: 4.9 % (ref 4–15)
NEUTROPHILS # BLD AUTO: 11.3 K/UL (ref 1.8–7.7)
NEUTROPHILS NFR BLD: 88.7 % (ref 38–73)
NITRITE UR QL STRIP: NEGATIVE
NRBC BLD-RTO: 0 /100 WBC
OPIATES UR QL SCN: NEGATIVE
PCP UR QL SCN>25 NG/ML: NEGATIVE
PH UR STRIP: 6 [PH] (ref 5–8)
PLATELET # BLD AUTO: 289 K/UL (ref 150–450)
PMV BLD AUTO: 8.3 FL (ref 9.2–12.9)
POTASSIUM SERPL-SCNC: 3 MMOL/L (ref 3.5–5.1)
PROT SERPL-MCNC: 7.6 G/DL (ref 6–8.4)
PROT UR QL STRIP: ABNORMAL
RBC # BLD AUTO: 4.26 M/UL (ref 4–5.4)
RBC #/AREA URNS HPF: >100 /HPF (ref 0–4)
SARS-COV-2 RDRP RESP QL NAA+PROBE: NEGATIVE
SODIUM SERPL-SCNC: 137 MMOL/L (ref 136–145)
SP GR UR STRIP: >=1.03 (ref 1–1.03)
SPECIMEN SOURCE: NORMAL
SQUAMOUS #/AREA URNS HPF: 21 /HPF
TOXICOLOGY INFORMATION: NORMAL
URN SPEC COLLECT METH UR: ABNORMAL
UROBILINOGEN UR STRIP-ACNC: ABNORMAL EU/DL
WBC # BLD AUTO: 12.76 K/UL (ref 3.9–12.7)
WBC #/AREA URNS HPF: 11 /HPF (ref 0–5)

## 2023-02-05 PROCEDURE — 87086 URINE CULTURE/COLONY COUNT: CPT | Performed by: NURSE PRACTITIONER

## 2023-02-05 PROCEDURE — 25000003 PHARM REV CODE 250: Performed by: NURSE PRACTITIONER

## 2023-02-05 PROCEDURE — 25000003 PHARM REV CODE 250: Performed by: SURGERY

## 2023-02-05 PROCEDURE — 87502 INFLUENZA DNA AMP PROBE: CPT | Performed by: SURGERY

## 2023-02-05 PROCEDURE — 36415 COLL VENOUS BLD VENIPUNCTURE: CPT | Performed by: NURSE PRACTITIONER

## 2023-02-05 PROCEDURE — U0002 COVID-19 LAB TEST NON-CDC: HCPCS | Performed by: SURGERY

## 2023-02-05 PROCEDURE — 81000 URINALYSIS NONAUTO W/SCOPE: CPT | Mod: 59 | Performed by: NURSE PRACTITIONER

## 2023-02-05 PROCEDURE — 83735 ASSAY OF MAGNESIUM: CPT | Performed by: NURSE PRACTITIONER

## 2023-02-05 PROCEDURE — 81025 URINE PREGNANCY TEST: CPT | Performed by: NURSE PRACTITIONER

## 2023-02-05 PROCEDURE — 99284 EMERGENCY DEPT VISIT MOD MDM: CPT | Mod: 25

## 2023-02-05 PROCEDURE — 85025 COMPLETE CBC W/AUTO DIFF WBC: CPT | Performed by: NURSE PRACTITIONER

## 2023-02-05 PROCEDURE — 80053 COMPREHEN METABOLIC PANEL: CPT | Performed by: NURSE PRACTITIONER

## 2023-02-05 PROCEDURE — 80307 DRUG TEST PRSMV CHEM ANLYZR: CPT | Performed by: NURSE PRACTITIONER

## 2023-02-05 PROCEDURE — 83690 ASSAY OF LIPASE: CPT | Performed by: NURSE PRACTITIONER

## 2023-02-05 RX ORDER — DIPHENOXYLATE HYDROCHLORIDE AND ATROPINE SULFATE 2.5; .025 MG/1; MG/1
1 TABLET ORAL 4 TIMES DAILY PRN
Qty: 20 TABLET | Refills: 0 | Status: SHIPPED | OUTPATIENT
Start: 2023-02-05 | End: 2023-02-15

## 2023-02-05 RX ORDER — POTASSIUM CHLORIDE 20 MEQ/1
40 TABLET, EXTENDED RELEASE ORAL
Status: COMPLETED | OUTPATIENT
Start: 2023-02-05 | End: 2023-02-05

## 2023-02-05 RX ORDER — PANTOPRAZOLE SODIUM 40 MG/1
40 TABLET, DELAYED RELEASE ORAL DAILY
Qty: 30 TABLET | Refills: 0 | Status: SHIPPED | OUTPATIENT
Start: 2023-02-05 | End: 2023-03-07

## 2023-02-05 RX ORDER — SODIUM CHLORIDE 9 MG/ML
1000 INJECTION, SOLUTION INTRAVENOUS
Status: COMPLETED | OUTPATIENT
Start: 2023-02-05 | End: 2023-02-05

## 2023-02-05 RX ORDER — ONDANSETRON 4 MG/1
4 TABLET, ORALLY DISINTEGRATING ORAL EVERY 8 HOURS PRN
Qty: 20 TABLET | Refills: 0 | Status: SHIPPED | OUTPATIENT
Start: 2023-02-05

## 2023-02-05 RX ORDER — DIPHENOXYLATE HYDROCHLORIDE AND ATROPINE SULFATE 2.5; .025 MG/1; MG/1
2 TABLET ORAL
Status: COMPLETED | OUTPATIENT
Start: 2023-02-05 | End: 2023-02-05

## 2023-02-05 RX ORDER — ONDANSETRON 4 MG/1
4 TABLET, ORALLY DISINTEGRATING ORAL
Status: COMPLETED | OUTPATIENT
Start: 2023-02-05 | End: 2023-02-05

## 2023-02-05 RX ADMIN — DIPHENOXYLATE HYDROCHLORIDE AND ATROPINE SULFATE 2 TABLET: 2.5; .025 TABLET ORAL at 10:02

## 2023-02-05 RX ADMIN — POTASSIUM CHLORIDE 40 MEQ: 1500 TABLET, EXTENDED RELEASE ORAL at 09:02

## 2023-02-05 RX ADMIN — ONDANSETRON 4 MG: 4 TABLET, ORALLY DISINTEGRATING ORAL at 07:02

## 2023-02-05 RX ADMIN — SODIUM CHLORIDE 1000 ML: 9 INJECTION, SOLUTION INTRAVENOUS at 09:02

## 2023-02-06 VITALS
RESPIRATION RATE: 18 BRPM | BODY MASS INDEX: 38.56 KG/M2 | DIASTOLIC BLOOD PRESSURE: 78 MMHG | HEART RATE: 91 BPM | OXYGEN SATURATION: 97 % | SYSTOLIC BLOOD PRESSURE: 145 MMHG | WEIGHT: 196.44 LBS | HEIGHT: 60 IN | TEMPERATURE: 99 F

## 2023-02-06 NOTE — ED PROVIDER NOTES
Encounter Date: 2/5/2023       History     Chief Complaint   Patient presents with    Abdominal Pain     Patient arrived to ED with n/v/d x 3 days with epigastric pain. NADN in triage.      Nolvia Garcia is a 47 y.o. female with PMH of GERD, anxiety, migraine headaches who presents to the ED for evaluation of nausea, vomiting, and diarrhea.  Patient reports that symptoms started 2 days ago with nausea with multiple episodes of bilious, nonbloody emesis and loose, nonbloody stool.  She reports that she has continued nausea, now with dry heaving and generalized abdominal pain and cramping.  Denies urinary symptoms.  Denies fever, chills, or body aches.  Denies sick contacts at home.  Denies recent travels, recent use of antibiotics, or consumption of undercooked foods.    The history is provided by the patient.     Review of patient's allergies indicates:  No Known Allergies  Past Medical History:   Diagnosis Date    Abnormal Pap smear of cervix     Anxiety     GERD (gastroesophageal reflux disease)     Migraines      Past Surgical History:   Procedure Laterality Date    APPENDECTOMY      CYSTOSCOPY N/A 10/23/2018    Procedure: CYSTOSCOPY;  Surgeon: Lesly Mendoza MD;  Location: [facility];  Service: OB/GYN;  Laterality: N/A;    DILATION AND CURETTAGE OF UTERUS N/A 6/13/2018    Procedure: DILATION AND CURETTAGE, UTERUS;  Surgeon: Lesly Mendoza MD;  Location: [facility] OR;  Service: OB/GYN;  Laterality: N/A;    LAPAROSCOPIC OOPHORECTOMY Right 10/23/2018    Procedure: OOPHORECTOMY, LAPAROSCOPIC;  Surgeon: Lesly Mendoza MD;  Location: [facility] OR;  Service: OB/GYN;  Laterality: Right;    LAPAROSCOPIC SALPINGECTOMY Bilateral 10/23/2018    Procedure: SALPINGECTOMY, LAPAROSCOPIC;  Surgeon: Lesly Mendoza MD;  Location: [facility] OR;  Service: OB/GYN;  Laterality: Bilateral;    LAPAROSCOPIC TOTAL HYSTERECTOMY N/A 10/23/2018    Procedure: HYSTERECTOMY, TOTAL, LAPAROSCOPIC;  Surgeon: Lesly Mendoza MD;  Location: [facility] OR;  Service:  OB/GYN;  Laterality: N/A;    ROBOT-ASSISTED LAPAROSCOPIC PARTIAL NEPHRECTOMY Left 12/21/2018    Procedure: ROBOTIC NEPHRECTOMY, PARTIAL;  Surgeon: Justin Vo MD;  Location: [address];  Service: Urology;  Laterality: Left;  gen with regional  [employer] robotic drop in probe conformation #[id number] lb  T&S AM OF SURGERY    THERMAL ABLATION OF ENDOMETRIUM USING HYSTEROSCOPY N/A 6/13/2018    Procedure: ABLATION, ENDOMETRIUM, THERMAL;  Surgeon: Lesly Mendoza MD;  Location: [facility];  Service: OB/GYN;  Laterality: N/A;    TUBAL LIGATION       Family History   Problem Relation Age of Onset    No Known Problems Mother     No Known Problems Father     Breast cancer Maternal Grandmother     Colon cancer Neg Hx     Ovarian cancer Neg Hx      Social History     Tobacco Use    Smoking status: Never    Smokeless tobacco: Never   Substance Use Topics    Alcohol use: Yes     Alcohol/week: 0.0 standard drinks     Comment: rare    Drug use: No     Review of Systems   Constitutional:  Positive for appetite change. Negative for fever.   HENT:  Negative for sore throat.    Respiratory:  Negative for chest tightness and shortness of breath.    Cardiovascular:  Negative for chest pain.   Gastrointestinal:  Positive for abdominal pain, diarrhea, nausea and vomiting.   Genitourinary:  Negative for dysuria, frequency and urgency.   Musculoskeletal:  Negative for back pain.   Skin:  Negative for rash.   Neurological:  Negative for dizziness, weakness, light-headedness and numbness.   Hematological:  Does not bruise/bleed easily.     Physical Exam     Initial Vitals [02/05/23 1935]   BP Pulse Resp Temp SpO2   (!) 176/84 104 18 98.8 °F (37.1 °C) 97 %      MAP       --         Physical Exam    Nursing note and vitals reviewed.  Constitutional: She appears well-developed and well-nourished.   HENT:   Head: Normocephalic and atraumatic.   Right Ear: Tympanic membrane, external ear and ear canal normal. Tympanic membrane is not  erythematous. No middle ear effusion.   Left Ear: Tympanic membrane, external ear and ear canal normal. Tympanic membrane is not erythematous.  No middle ear effusion.   Nose: Nose normal.   Mouth/Throat: Uvula is midline and oropharynx is clear and moist. Mucous membranes are dry.   Eyes: Conjunctivae and EOM are normal. Pupils are equal, round, and reactive to light.   Neck: Neck supple.   Normal range of motion.  Cardiovascular:  Normal rate, regular rhythm, normal heart sounds and intact distal pulses.           Pulmonary/Chest: Effort normal and breath sounds normal. She has no decreased breath sounds. She has no wheezes. She has no rhonchi. She has no rales.   Abdominal: Abdomen is soft. Bowel sounds are normal. There is generalized abdominal tenderness.   Musculoskeletal:         General: Normal range of motion.      Cervical back: Normal range of motion and neck supple.     Neurological: She is alert and oriented to person, place, and time. She has normal strength. She displays normal reflexes. No cranial nerve deficit or sensory deficit.   Skin: Skin is warm and dry. Capillary refill takes less than 2 seconds. No rash noted.   Psychiatric: She has a normal mood and affect. Her behavior is normal. Judgment and thought content normal.       ED Course   Procedures  Labs Reviewed   CBC W/ AUTO DIFFERENTIAL - Abnormal; Notable for the following components:       Result Value    WBC 12.76 (*)     Hematocrit 36.2 (*)     MPV 8.3 (*)     Gran # (ANC) 11.3 (*)     Immature Grans (Abs) 0.07 (*)     Lymph # 0.7 (*)     Gran % 88.7 (*)     Lymph % 5.6 (*)     All other components within normal limits   COMPREHENSIVE METABOLIC PANEL - Abnormal; Notable for the following components:    Potassium 3.0 (*)     Calcium 8.6 (*)     Albumin 3.4 (*)     All other components within normal limits   URINALYSIS, REFLEX TO URINE CULTURE - Abnormal; Notable for the following components:    Appearance, UA Hazy (*)     Specific  Gravity, UA >=1.030 (*)     Protein, UA 3+ (*)     Ketones, UA 3+ (*)     Bilirubin (UA) 1+ (*)     Occult Blood UA 3+ (*)     Urobilinogen, UA 4.0-6.0 (*)     All other components within normal limits    Narrative:     Specimen Source->Urine   URINALYSIS MICROSCOPIC - Abnormal; Notable for the following components:    RBC, UA >100 (*)     WBC, UA 11 (*)     Bacteria Many (*)     All other components within normal limits    Narrative:     Specimen Source->Urine   INFLUENZA A & B BY MOLECULAR   INFLUENZA A & B BY MOLECULAR   CULTURE, URINE   LIPASE   PREGNANCY TEST, URINE RAPID    Narrative:     Specimen Source->Urine   DRUG SCREEN PANEL, URINE EMERGENCY    Narrative:     Specimen Source->Urine   SARS-COV-2 RNA AMPLIFICATION, QUAL   MAGNESIUM   MAGNESIUM          Imaging Results              X-Ray Abdomen Flat And Erect (Final result)  Result time 02/05/23 22:19:45      Final result by Joe Pastor MD (02/05/23 22:19:45)                   Impression:      Nonobstructive bowel gas pattern.      Electronically signed by: Joe Pastor MD  Date:    02/05/2023  Time:    22:19               Narrative:    EXAMINATION:  XR ABDOMEN FLAT AND ERECT    CLINICAL HISTORY:  Nausea (787.02);    TECHNIQUE:  Flat and erect AP views of the abdomen were performed.    COMPARISON:  01/31/2022    FINDINGS:  The visualized lung bases are unremarkable.  There is no evidence of free air beneath the hemidiaphragms.    There are postoperative changes in the right hemiabdomen.  The stomach is nondistended.  There are no differential air-fluid levels.  There is no evidence of pneumatosis.  No portal venous air is identified.    There are phleboliths in the pelvis.  The osseous structures are unremarkable.                                       Medications   diphenoxylate-atropine 2.5-0.025 mg per tablet 2 tablet (has no administration in time range)   ondansetron disintegrating tablet 4 mg (4 mg Oral Given 2/5/23 1936)   0.9%  NaCl infusion (1,000  mLs Intravenous New Bag 2/5/23 2147)   potassium chloride SA CR tablet 40 mEq (40 mEq Oral Given 2/5/23 2148)     Medical Decision Making:   I took over this patient with the nurse practitioner at the 9:00 p.m. shift change tonight  47-year-old female with nausea vomiting & watery diarrhea for last couple days now  Patient states she is had vomiting all weekend & feels dehydrated, denies any fever  Stable lab work, IV fluids, antiemetics & antidiarrheals administered in the ER today  Patient feeling better after IV fluids, will be prescribed antidiarrheals on discharge  PCP follow-up 48 hours, bland diet for next 2 weeks until completely resolved  Patient counseled to return to the ER with any concerning symptoms on discharge                        Clinical Impression:   Final diagnoses:  [A08.4] Viral gastroenteritis (Primary)  [E87.6] Hypokalemia        ED Disposition Condition    Discharge Stable          ED Prescriptions       Medication Sig Dispense Start Date End Date Auth. Provider    pantoprazole (PROTONIX) 40 MG tablet Take 1 tablet (40 mg total) by mouth once daily. 30 tablet 2/5/2023 3/7/2023 Peyman Cortez MD    diphenoxylate-atropine 2.5-0.025 mg (LOMOTIL) 2.5-0.025 mg per tablet Take 1 tablet by mouth 4 (four) times daily as needed for Diarrhea. 20 tablet 2/5/2023 2/15/2023 Peyman Cortez MD    ondansetron (ZOFRAN-ODT) 4 MG TbDL Take 1 tablet (4 mg total) by mouth every 8 (eight) hours as needed (nausea). 20 tablet 2/5/2023 -- Peyman Cortez MD          Follow-up Information       Follow up With Specialties Details Why Contact Info    Ten Hunt MD Internal Medicine Schedule an appointment as soon as possible for a visit in 2 days  [phone]  [facility] [address]  [phone]               Peyman Cortez MD  02/05/23 9879

## 2023-02-07 LAB
BACTERIA UR CULT: NORMAL
BACTERIA UR CULT: NORMAL

## 2023-05-04 ENCOUNTER — PATIENT MESSAGE (OUTPATIENT)
Dept: ADMINISTRATIVE | Facility: HOSPITAL | Age: 48
End: 2023-05-04
Payer: MEDICAID

## 2023-07-10 ENCOUNTER — PATIENT MESSAGE (OUTPATIENT)
Dept: ADMINISTRATIVE | Facility: HOSPITAL | Age: 48
End: 2023-07-10
Payer: MEDICAID

## 2025-02-01 ENCOUNTER — HOSPITAL ENCOUNTER (EMERGENCY)
Facility: HOSPITAL | Age: 50
Discharge: HOME | End: 2025-02-01
Attending: SURGERY

## 2025-02-01 VITALS
BODY MASS INDEX: 40.15 KG/M2 | TEMPERATURE: 100 F | OXYGEN SATURATION: 97 % | SYSTOLIC BLOOD PRESSURE: 168 MMHG | WEIGHT: 204.5 LBS | DIASTOLIC BLOOD PRESSURE: 84 MMHG | HEIGHT: 60 IN | HEART RATE: 106 BPM | RESPIRATION RATE: 19 BRPM

## 2025-02-01 DIAGNOSIS — B96.89 BACTERIAL VAGINOSIS: ICD-10-CM

## 2025-02-01 DIAGNOSIS — N30.00 ACUTE CYSTITIS WITHOUT HEMATURIA: Primary | ICD-10-CM

## 2025-02-01 DIAGNOSIS — N76.0 BACTERIAL VAGINOSIS: ICD-10-CM

## 2025-02-01 LAB
BACTERIA #/AREA URNS HPF: ABNORMAL /HPF
BILIRUB UR QL STRIP: NEGATIVE
CLARITY UR: ABNORMAL
COLOR UR: YELLOW
GLUCOSE UR QL STRIP: NEGATIVE
HGB UR QL STRIP: ABNORMAL
KETONES UR QL STRIP: NEGATIVE
LEUKOCYTE ESTERASE UR QL STRIP: ABNORMAL
MICROSCOPIC COMMENT: ABNORMAL
NITRITE UR QL STRIP: NEGATIVE
PH UR STRIP: 7 [PH] (ref 5–8)
PROT UR QL STRIP: NEGATIVE
RBC #/AREA URNS HPF: 3 /HPF (ref 0–4)
SP GR UR STRIP: 1.01 (ref 1–1.03)
SQUAMOUS #/AREA URNS HPF: 2 /HPF
URN SPEC COLLECT METH UR: ABNORMAL
UROBILINOGEN UR STRIP-ACNC: NEGATIVE EU/DL
WBC #/AREA URNS HPF: 15 /HPF (ref 0–5)
WBC CLUMPS URNS QL MICRO: ABNORMAL

## 2025-02-01 PROCEDURE — 99284 EMERGENCY DEPT VISIT MOD MDM: CPT

## 2025-02-01 PROCEDURE — 87491 CHLMYD TRACH DNA AMP PROBE: CPT | Performed by: SURGERY

## 2025-02-01 PROCEDURE — 81000 URINALYSIS NONAUTO W/SCOPE: CPT | Performed by: SURGERY

## 2025-02-01 PROCEDURE — 87086 URINE CULTURE/COLONY COUNT: CPT | Performed by: SURGERY

## 2025-02-01 RX ORDER — DOXYCYCLINE 100 MG/1
100 CAPSULE ORAL 2 TIMES DAILY
Qty: 14 CAPSULE | Refills: 0 | Status: SHIPPED | OUTPATIENT
Start: 2025-02-01 | End: 2025-02-08

## 2025-02-01 RX ORDER — METRONIDAZOLE 500 MG/1
500 TABLET ORAL EVERY 12 HOURS
Qty: 14 TABLET | Refills: 0 | Status: SHIPPED | OUTPATIENT
Start: 2025-02-01 | End: 2025-02-08

## 2025-02-02 NOTE — ED PROVIDER NOTES
"Encounter Date: 2/1/2025       History     Chief Complaint   Patient presents with    Vaginal Discharge     Pt to ED with c/o vaginal pain, itching and "milky foul smelling" discharge x 2 weeks, attempted 3 day and 7 day Monistat without relief.     This note is dictated on M*Modal word recognition program.  There are word recognition mistakes and grammatical errors that are occasionally missed on review.       Nolvia Garcia is a 49 y.o. female with complaints of milky foul-smelling vaginal discharge for the last week.  Patient reports she tried 7 days of Monistat with out relief of symptoms she reports vaginal irritation/itching as well.  Denies pelvic pain.  Denies fevers.      The history is provided by the patient.     Review of patient's allergies indicates:  No Known Allergies  Past Medical History:   Diagnosis Date    Abnormal Pap smear of cervix     Anxiety     GERD (gastroesophageal reflux disease)     Migraines      Past Surgical History:   Procedure Laterality Date    APPENDECTOMY      CYSTOSCOPY N/A 10/23/2018    Procedure: CYSTOSCOPY;  Surgeon: Lesly Mendoza MD;  Location: [facility];  Service: OB/GYN;  Laterality: N/A;    DILATION AND CURETTAGE OF UTERUS N/A 6/13/2018    Procedure: DILATION AND CURETTAGE, UTERUS;  Surgeon: Lesly Mendoza MD;  Location: [facility] OR;  Service: OB/GYN;  Laterality: N/A;    LAPAROSCOPIC OOPHORECTOMY Right 10/23/2018    Procedure: OOPHORECTOMY, LAPAROSCOPIC;  Surgeon: Lesly Mendoza MD;  Location: [facility] OR;  Service: OB/GYN;  Laterality: Right;    LAPAROSCOPIC SALPINGECTOMY Bilateral 10/23/2018    Procedure: SALPINGECTOMY, LAPAROSCOPIC;  Surgeon: Lesly Mendoza MD;  Location: [facility] OR;  Service: OB/GYN;  Laterality: Bilateral;    LAPAROSCOPIC TOTAL HYSTERECTOMY N/A 10/23/2018    Procedure: HYSTERECTOMY, TOTAL, LAPAROSCOPIC;  Surgeon: Lesly Mendoza MD;  Location: [facility] OR;  Service: OB/GYN;  Laterality: N/A;    ROBOT-ASSISTED LAPAROSCOPIC PARTIAL NEPHRECTOMY Left 12/21/2018    " Procedure: ROBOTIC NEPHRECTOMY, PARTIAL;  Surgeon: Justin Vo MD;  Location: [facility] OR [address] [place] [address];  Service: Urology;  Laterality: Left;  gen with regional  [employer] robotic drop in probe conformation #[id number] lb  T&S AM OF SURGERY    THERMAL ABLATION OF ENDOMETRIUM USING HYSTEROSCOPY N/A 6/13/2018    Procedure: ABLATION, ENDOMETRIUM, THERMAL;  Surgeon: Lesly Mendoza MD;  Location: [facility];  Service: OB/GYN;  Laterality: N/A;    TUBAL LIGATION       Family History   Problem Relation Name Age of Onset    No Known Problems Mother      No Known Problems Father      Breast cancer Maternal Grandmother      Colon cancer Neg Hx      Ovarian cancer Neg Hx       Social History     Tobacco Use    Smoking status: Never    Smokeless tobacco: Never   Substance Use Topics    Alcohol use: Yes     Alcohol/week: 0.0 standard drinks of alcohol     Comment: rare    Drug use: No     Review of Systems   Genitourinary:  Positive for dysuria and vaginal discharge.   All other systems reviewed and are negative.      Physical Exam     Initial Vitals [02/01/25 1906]   BP Pulse Resp Temp SpO2   (!) 168/84 106 19 99.5 °F (37.5 °C) 97 %      MAP       --         Physical Exam    Constitutional: She appears well-developed.   HENT:   Head: Normocephalic.   Eyes: Pupils are equal, round, and reactive to light.   Neck: Neck supple. No thyromegaly present.   Normal range of motion.  Cardiovascular:  Normal rate and regular rhythm.           Pulmonary/Chest: Breath sounds normal. No respiratory distress. She exhibits no tenderness.   Abdominal: She exhibits no distension.   Genitourinary:    Genitourinary Comments: Deferred to gyn     Musculoskeletal:         General: No tenderness or edema.      Cervical back: Normal range of motion and neck supple.     Neurological: She is alert and oriented to person, place, and time. GCS score is 15. GCS eye subscore is 4. GCS verbal subscore is 5. GCS motor subscore is 6.   Skin: Capillary refill takes less  than 2 seconds. No rash noted. No erythema.   Psychiatric: She has a normal mood and affect. Thought content normal.         ED Course   Procedures  Labs Reviewed   URINALYSIS, REFLEX TO URINE CULTURE - Abnormal       Result Value    Specimen UA Urine, Clean Catch      Color, UA Yellow      Appearance, UA Hazy (*)     pH, UA 7.0      Specific Gravity, UA 1.010      Protein, UA Negative      Glucose, UA Negative      Ketones, UA Negative      Bilirubin (UA) Negative      Occult Blood UA 1+ (*)     Nitrite, UA Negative      Urobilinogen, UA Negative      Leukocytes, UA 3+ (*)     Narrative:     Specimen Source->Urine   URINALYSIS MICROSCOPIC - Abnormal    RBC, UA 3      WBC, UA 15 (*)     WBC Clumps, UA Rare      Bacteria Few (*)     Squam Epithel, UA 2      Microscopic Comment SEE COMMENT      Narrative:     Specimen Source->Urine   CULTURE, URINE   C. TRACHOMATIS/N. GONORRHOEAE BY AMP DNA          Imaging Results    None          Medications - No data to display  Medical Decision Making  Differential diagnosis include urinary tract infection, bacterial vaginosis, yeast infection, acute vaginitis    Patient reports low suspicion STD.  Urinalysis that is occult blood, 3+ leukocytes, 15 white blood cells few bacteria consistent with acute cystitis will place patient on doxycycline.  Patient has vaginal discharge symptoms consistent with bacterial vaginosis.  Patient's discharge was not responsive to Monistat.  Will cover patient with Flagyl to treat for possible BV.  Patient instructed to follow-up with gyn.  Vital signs stable at discharge  Patient stable at time of discharge in no acute distress.  No life-threatening illnesses were found during ER visit today.  Patient was instructed to follow-up with PCP or other recommended specialist within the next 48-72 hours.  Patient was instructed to return to ER immediately for any worsening or concerning symptoms.  All discharge instructions discussed with patient, and  patient agrees to comply with discharge instructions given today.     Amount and/or Complexity of Data Reviewed  Labs: ordered.    Risk  Prescription drug management.                                      Clinical Impression:  Final diagnoses:  [N30.00] Acute cystitis without hematuria (Primary)  [N76.0, B96.89] Bacterial vaginosis          ED Disposition Condition    Discharge Stable          ED Prescriptions       Medication Sig Dispense Start Date End Date Auth. Provider    doxycycline (VIBRAMYCIN) 100 MG Cap Take 1 capsule (100 mg total) by mouth 2 (two) times daily. for 7 days 14 capsule 2/1/2025 2/8/2025 Peyman Parker NP    metroNIDAZOLE (FLAGYL) 500 MG tablet Take 1 tablet (500 mg total) by mouth every 12 (twelve) hours. for 7 days 14 tablet 2/1/2025 2/8/2025 Peyman Parker NP          Follow-up Information       Follow up With Specialties Details Why Contact Info    Birgit Hassan MD Obstetrics and Gynecology In 2 days For wound re-check please follow-up within 24-48 hours with OBGYN please [address] [place] [address]  [phone]               Peyman Parker NP  02/01/25 2009

## 2025-02-03 LAB
BACTERIA UR CULT: NORMAL
BACTERIA UR CULT: NORMAL

## 2025-02-04 LAB
C TRACH DNA SPEC QL NAA+PROBE: NOT DETECTED
N GONORRHOEA DNA SPEC QL NAA+PROBE: NOT DETECTED

## (undated) DEVICE — SUT CTD VICRYL VIL BR SH 27

## (undated) DEVICE — SOL NS 1000CC

## (undated) DEVICE — KIT ANTIFOG

## (undated) DEVICE — NDL INSUF ULTRA VERESS 120MM

## (undated) DEVICE — SUT CTD VICRYL 0 UND BR CT

## (undated) DEVICE — SEE L#133928

## (undated) DEVICE — DRESSING ABSRBNT ISLAND 3.6X8

## (undated) DEVICE — BLADE SURG CARBON STEEL SZ11

## (undated) DEVICE — TOWELS STERILE 18 X 25.5

## (undated) DEVICE — SUT V-LOC 90 3-0 VIOLET

## (undated) DEVICE — IRRIGATOR ENDOSCOPY DISP.

## (undated) DEVICE — TRAY CATH FOLEY 16FR STAT LOC

## (undated) DEVICE — APPLICATOR CHLORAPREP ORN 26ML

## (undated) DEVICE — SOL ELECTROLUBE ANTI-STIC

## (undated) DEVICE — GLOVE BIOGEL SKINSENSE PI 7.0

## (undated) DEVICE — KIT SURGIFLO HEMOSTATIC MATRIX

## (undated) DEVICE — TROCAR ENDOPATH XCEL 5X100MM

## (undated) DEVICE — SUT PROLENE 4-0 RB-1 BL MO

## (undated) DEVICE — SUT 1 36IN PDS II VIO MONO

## (undated) DEVICE — TUBING LAPARSCOPIC INSUFFLATIN

## (undated) DEVICE — DISSECTOR SONICISION CRV 39CM

## (undated) DEVICE — CLIP HEMO-LOK MLX LARGE LF

## (undated) DEVICE — TROCAR ENDOPATH XCEL 12X100MM

## (undated) DEVICE — BAG TISS RETRV MONARCH 10MM

## (undated) DEVICE — DRAPE ABDOMINAL TIBURON 14X11

## (undated) DEVICE — SOL CLEARIFY VISUALIZATION LAP

## (undated) DEVICE — ELECTRODE REM PLYHSV RETURN 9

## (undated) DEVICE — SET TRI-LUMEN FILTERED TUBE

## (undated) DEVICE — PORT AIRSEAL 12/120MM LPI

## (undated) DEVICE — KIT PREP E-Z WET W/2-6

## (undated) DEVICE — COVER LIGHT HANDLE

## (undated) DEVICE — LEGGINGS 48X31 INCH

## (undated) DEVICE — HEMOSTAT SURGICEL 2X3IN

## (undated) DEVICE — NDL 18GA X1 1/2 REG BEVEL

## (undated) DEVICE — DEVICE ABLATION NOVASURE DISP

## (undated) DEVICE — TAPE SILK 3IN

## (undated) DEVICE — KIT ROBOTIC 3 ARM DA VINCI SI

## (undated) DEVICE — SUT ABS CLIP LAPRA-TY CTD

## (undated) DEVICE — SCISSOR 5MMX35CM DIRECT DRIVE

## (undated) DEVICE — SUT MCRYL PLUS 4-0 PS2 27IN

## (undated) DEVICE — ENDOSTITCH INSTRUMENT

## (undated) DEVICE — KIT POWDER ABSORBABLE GELATIN

## (undated) DEVICE — ADHESIVE DERMABOND ADVANCED

## (undated) DEVICE — SEE MEDLINE ITEM 157117

## (undated) DEVICE — CONTAINER SPECIMEN STRL 4OZ

## (undated) DEVICE — KIT VUETIP TROCAR SWAB

## (undated) DEVICE — CHLORAPREP W TINT 26ML APPL

## (undated) DEVICE — Device

## (undated) DEVICE — DRAPE SCOPE PILLOW WARMER

## (undated) DEVICE — SUTURE VICRYL PLUS 3-0 PS1 27

## (undated) DEVICE — PACK SET UP CONVERTORS

## (undated) DEVICE — TROCAR ENDOPATH XCEL 11MM 10CM

## (undated) DEVICE — SUT ETHILON 2-0 PSLX 30IN

## (undated) DEVICE — PACK LAPAROSCOPY

## (undated) DEVICE — SUT PDS II 0 CT-1 VIL MONO

## (undated) DEVICE — SUT V-LOC 2.0 GS-21 90 DAY

## (undated) DEVICE — EVACUATOR KIT SMOKE PLUME AWAY

## (undated) DEVICE — TRAY MINOR GEN SURG

## (undated) DEVICE — GLOVE BIOGEL ECLIPSE SZ 6.5

## (undated) DEVICE — GLOVE BIOGEL ECLIPSE SZ 8.5

## (undated) DEVICE — TRAY FOLEY 16FR INFECTION CONT

## (undated) DEVICE — GOWN SURGEON XLG

## (undated) DEVICE — SEE MEDLINE ITEM 157181

## (undated) DEVICE — FORCEP DISSECTING LYONS PKS

## (undated) DEVICE — COVER TIP CURVED SCISSORS XI

## (undated) DEVICE — GLOVE BIOGEL SKINSENSE PI 6.5

## (undated) DEVICE — PAD SANITARY OB STERILE